# Patient Record
Sex: FEMALE | Race: ASIAN | NOT HISPANIC OR LATINO | Employment: OTHER | URBAN - METROPOLITAN AREA
[De-identification: names, ages, dates, MRNs, and addresses within clinical notes are randomized per-mention and may not be internally consistent; named-entity substitution may affect disease eponyms.]

---

## 2022-07-14 ENCOUNTER — HOSPITAL ENCOUNTER (INPATIENT)
Facility: HOSPITAL | Age: 72
LOS: 1 days | Discharge: LEFT BEFORE DISCHARGE | DRG: 812 | End: 2022-07-15
Attending: FAMILY MEDICINE | Admitting: STUDENT IN AN ORGANIZED HEALTH CARE EDUCATION/TRAINING PROGRAM
Payer: COMMERCIAL

## 2022-07-14 DIAGNOSIS — I48.91 ATRIAL FIBRILLATION WITH RVR (H): ICD-10-CM

## 2022-07-14 DIAGNOSIS — D50.9 IRON DEFICIENCY ANEMIA, UNSPECIFIED IRON DEFICIENCY ANEMIA TYPE: ICD-10-CM

## 2022-07-14 LAB
ABO/RH(D): NORMAL
ABO/RH(D): NORMAL
ALBUMIN SERPL-MCNC: 3.3 G/DL (ref 3.5–5)
ALP SERPL-CCNC: 111 U/L (ref 45–120)
ALT SERPL W P-5'-P-CCNC: 23 U/L (ref 0–45)
ANION GAP SERPL CALCULATED.3IONS-SCNC: 7 MMOL/L (ref 5–18)
ANTIBODY SCREEN: NEGATIVE
AST SERPL W P-5'-P-CCNC: 33 U/L (ref 0–40)
BASOPHILS # BLD AUTO: 0 10E3/UL (ref 0–0.2)
BASOPHILS NFR BLD AUTO: 0 %
BILIRUB DIRECT SERPL-MCNC: 0.2 MG/DL
BILIRUB SERPL-MCNC: 0.7 MG/DL (ref 0–1)
BLD PROD TYP BPU: NORMAL
BLD PROD TYP BPU: NORMAL
BLOOD COMPONENT TYPE: NORMAL
BLOOD COMPONENT TYPE: NORMAL
BUN SERPL-MCNC: 7 MG/DL (ref 8–28)
CALCIUM SERPL-MCNC: 9.9 MG/DL (ref 8.5–10.5)
CHLORIDE BLD-SCNC: 106 MMOL/L (ref 98–107)
CO2 SERPL-SCNC: 23 MMOL/L (ref 22–31)
CODING SYSTEM: NORMAL
CODING SYSTEM: NORMAL
CREAT SERPL-MCNC: 0.77 MG/DL (ref 0.6–1.1)
CROSSMATCH: NORMAL
CROSSMATCH: NORMAL
EOSINOPHIL # BLD AUTO: 0.1 10E3/UL (ref 0–0.7)
EOSINOPHIL NFR BLD AUTO: 1 %
ERYTHROCYTE [DISTWIDTH] IN BLOOD BY AUTOMATED COUNT: 22.5 % (ref 10–15)
GFR SERPL CREATININE-BSD FRML MDRD: 81 ML/MIN/1.73M2
GLUCOSE BLD-MCNC: 145 MG/DL (ref 70–125)
HCT VFR BLD AUTO: 19.8 % (ref 35–47)
HGB BLD-MCNC: 4.9 G/DL (ref 11.7–15.7)
HOLD SPECIMEN: NORMAL
IMM GRANULOCYTES # BLD: 0.1 10E3/UL
IMM GRANULOCYTES NFR BLD: 1 %
ISSUE DATE AND TIME: NORMAL
ISSUE DATE AND TIME: NORMAL
LYMPHOCYTES # BLD AUTO: 1.5 10E3/UL (ref 0.8–5.3)
LYMPHOCYTES NFR BLD AUTO: 27 %
MCH RBC QN AUTO: 15.8 PG (ref 26.5–33)
MCHC RBC AUTO-ENTMCNC: 24.7 G/DL (ref 31.5–36.5)
MCV RBC AUTO: 64 FL (ref 78–100)
MONOCYTES # BLD AUTO: 0.5 10E3/UL (ref 0–1.3)
MONOCYTES NFR BLD AUTO: 9 %
NEUTROPHILS # BLD AUTO: 3.5 10E3/UL (ref 1.6–8.3)
NEUTROPHILS NFR BLD AUTO: 62 %
NRBC # BLD AUTO: 0.1 10E3/UL
NRBC BLD AUTO-RTO: 2 /100
PLATELET # BLD AUTO: 158 10E3/UL (ref 150–450)
POTASSIUM BLD-SCNC: 3.6 MMOL/L (ref 3.5–5)
PROT SERPL-MCNC: 7.2 G/DL (ref 6–8)
RBC # BLD AUTO: 3.11 10E6/UL (ref 3.8–5.2)
SODIUM SERPL-SCNC: 136 MMOL/L (ref 136–145)
SPECIMEN EXPIRATION DATE: NORMAL
SPECIMEN EXPIRATION DATE: NORMAL
UNIT ABO/RH: NORMAL
UNIT ABO/RH: NORMAL
UNIT NUMBER: NORMAL
UNIT NUMBER: NORMAL
UNIT STATUS: NORMAL
UNIT STATUS: NORMAL
UNIT TYPE ISBT: 7300
UNIT TYPE ISBT: 7300
WBC # BLD AUTO: 5.7 10E3/UL (ref 4–11)

## 2022-07-14 PROCEDURE — 80053 COMPREHEN METABOLIC PANEL: CPT | Performed by: FAMILY MEDICINE

## 2022-07-14 PROCEDURE — 36415 COLL VENOUS BLD VENIPUNCTURE: CPT | Performed by: STUDENT IN AN ORGANIZED HEALTH CARE EDUCATION/TRAINING PROGRAM

## 2022-07-14 PROCEDURE — 82728 ASSAY OF FERRITIN: CPT | Performed by: STUDENT IN AN ORGANIZED HEALTH CARE EDUCATION/TRAINING PROGRAM

## 2022-07-14 PROCEDURE — 96374 THER/PROPH/DIAG INJ IV PUSH: CPT

## 2022-07-14 PROCEDURE — 84443 ASSAY THYROID STIM HORMONE: CPT | Performed by: STUDENT IN AN ORGANIZED HEALTH CARE EDUCATION/TRAINING PROGRAM

## 2022-07-14 PROCEDURE — 250N000011 HC RX IP 250 OP 636: Performed by: FAMILY MEDICINE

## 2022-07-14 PROCEDURE — 36430 TRANSFUSION BLD/BLD COMPNT: CPT

## 2022-07-14 PROCEDURE — 93005 ELECTROCARDIOGRAM TRACING: CPT | Performed by: FAMILY MEDICINE

## 2022-07-14 PROCEDURE — 258N000003 HC RX IP 258 OP 636: Performed by: FAMILY MEDICINE

## 2022-07-14 PROCEDURE — 84466 ASSAY OF TRANSFERRIN: CPT | Performed by: FAMILY MEDICINE

## 2022-07-14 PROCEDURE — 86901 BLOOD TYPING SEROLOGIC RH(D): CPT | Performed by: FAMILY MEDICINE

## 2022-07-14 PROCEDURE — 83550 IRON BINDING TEST: CPT | Performed by: FAMILY MEDICINE

## 2022-07-14 PROCEDURE — 87635 SARS-COV-2 COVID-19 AMP PRB: CPT | Performed by: FAMILY MEDICINE

## 2022-07-14 PROCEDURE — 99285 EMERGENCY DEPT VISIT HI MDM: CPT | Mod: 25

## 2022-07-14 PROCEDURE — P9016 RBC LEUKOCYTES REDUCED: HCPCS | Performed by: FAMILY MEDICINE

## 2022-07-14 PROCEDURE — C9803 HOPD COVID-19 SPEC COLLECT: HCPCS

## 2022-07-14 PROCEDURE — 82607 VITAMIN B-12: CPT | Performed by: STUDENT IN AN ORGANIZED HEALTH CARE EDUCATION/TRAINING PROGRAM

## 2022-07-14 PROCEDURE — C9113 INJ PANTOPRAZOLE SODIUM, VIA: HCPCS | Performed by: FAMILY MEDICINE

## 2022-07-14 PROCEDURE — 85048 AUTOMATED LEUKOCYTE COUNT: CPT | Performed by: FAMILY MEDICINE

## 2022-07-14 PROCEDURE — 86923 COMPATIBILITY TEST ELECTRIC: CPT | Performed by: FAMILY MEDICINE

## 2022-07-14 PROCEDURE — 82248 BILIRUBIN DIRECT: CPT | Performed by: STUDENT IN AN ORGANIZED HEALTH CARE EDUCATION/TRAINING PROGRAM

## 2022-07-14 PROCEDURE — 36415 COLL VENOUS BLD VENIPUNCTURE: CPT | Performed by: FAMILY MEDICINE

## 2022-07-14 PROCEDURE — 85048 AUTOMATED LEUKOCYTE COUNT: CPT | Performed by: STUDENT IN AN ORGANIZED HEALTH CARE EDUCATION/TRAINING PROGRAM

## 2022-07-14 RX ORDER — ACETAMINOPHEN 325 MG/1
650 TABLET ORAL EVERY 6 HOURS PRN
Status: DISCONTINUED | OUTPATIENT
Start: 2022-07-14 | End: 2022-07-15 | Stop reason: HOSPADM

## 2022-07-14 RX ORDER — ACETAMINOPHEN 650 MG/1
650 SUPPOSITORY RECTAL EVERY 6 HOURS PRN
Status: DISCONTINUED | OUTPATIENT
Start: 2022-07-14 | End: 2022-07-15 | Stop reason: HOSPADM

## 2022-07-14 RX ORDER — ONDANSETRON 4 MG/1
4 TABLET, ORALLY DISINTEGRATING ORAL EVERY 6 HOURS PRN
Status: DISCONTINUED | OUTPATIENT
Start: 2022-07-14 | End: 2022-07-15 | Stop reason: HOSPADM

## 2022-07-14 RX ORDER — ACETAMINOPHEN 325 MG/1
325-650 TABLET ORAL EVERY 6 HOURS PRN
COMMUNITY

## 2022-07-14 RX ORDER — HYDROXYZINE HYDROCHLORIDE 10 MG/1
10 TABLET, FILM COATED ORAL 3 TIMES DAILY PRN
Status: DISCONTINUED | OUTPATIENT
Start: 2022-07-14 | End: 2022-07-15 | Stop reason: HOSPADM

## 2022-07-14 RX ORDER — ONDANSETRON 2 MG/ML
4 INJECTION INTRAMUSCULAR; INTRAVENOUS EVERY 6 HOURS PRN
Status: DISCONTINUED | OUTPATIENT
Start: 2022-07-14 | End: 2022-07-15 | Stop reason: HOSPADM

## 2022-07-14 RX ORDER — IBUPROFEN 200 MG
400 TABLET ORAL EVERY 4 HOURS PRN
COMMUNITY
End: 2022-07-15

## 2022-07-14 RX ADMIN — SODIUM CHLORIDE 500 ML: 9 INJECTION, SOLUTION INTRAVENOUS at 22:39

## 2022-07-14 RX ADMIN — PANTOPRAZOLE SODIUM 40 MG: 40 INJECTION, POWDER, FOR SOLUTION INTRAVENOUS at 22:37

## 2022-07-14 ASSESSMENT — ENCOUNTER SYMPTOMS
WEAKNESS: 1
DIZZINESS: 1
COUGH: 1
DIARRHEA: 0
BLOOD IN STOOL: 0
FATIGUE: 1
NAUSEA: 0
VOMITING: 0
SHORTNESS OF BREATH: 0

## 2022-07-15 ENCOUNTER — APPOINTMENT (OUTPATIENT)
Dept: CARDIOLOGY | Facility: HOSPITAL | Age: 72
DRG: 812 | End: 2022-07-15
Attending: STUDENT IN AN ORGANIZED HEALTH CARE EDUCATION/TRAINING PROGRAM
Payer: COMMERCIAL

## 2022-07-15 ENCOUNTER — APPOINTMENT (OUTPATIENT)
Dept: CT IMAGING | Facility: HOSPITAL | Age: 72
DRG: 812 | End: 2022-07-15
Attending: STUDENT IN AN ORGANIZED HEALTH CARE EDUCATION/TRAINING PROGRAM
Payer: COMMERCIAL

## 2022-07-15 VITALS
TEMPERATURE: 97.8 F | DIASTOLIC BLOOD PRESSURE: 111 MMHG | SYSTOLIC BLOOD PRESSURE: 147 MMHG | HEART RATE: 100 BPM | HEIGHT: 60 IN | RESPIRATION RATE: 25 BRPM | WEIGHT: 120 LBS | BODY MASS INDEX: 23.56 KG/M2 | OXYGEN SATURATION: 90 %

## 2022-07-15 PROBLEM — I48.91 ATRIAL FIBRILLATION WITH RVR (H): Status: ACTIVE | Noted: 2022-07-15

## 2022-07-15 LAB
ATRIAL RATE - MUSE: 125 BPM
BASOPHILS # BLD AUTO: 0 10E3/UL (ref 0–0.2)
BASOPHILS NFR BLD AUTO: 0 %
DIASTOLIC BLOOD PRESSURE - MUSE: NORMAL MMHG
EOSINOPHIL # BLD AUTO: 0.1 10E3/UL (ref 0–0.7)
EOSINOPHIL NFR BLD AUTO: 1 %
ERYTHROCYTE [DISTWIDTH] IN BLOOD BY AUTOMATED COUNT: 26.4 % (ref 10–15)
FERRITIN SERPL-MCNC: 3 NG/ML (ref 11–328)
FOLATE SERPL-MCNC: 11.3 NG/ML (ref 4.6–34.8)
HCT VFR BLD AUTO: 30.5 % (ref 35–47)
HGB BLD-MCNC: 8.5 G/DL (ref 11.7–15.7)
HGB BLD-MCNC: 8.9 G/DL (ref 11.7–15.7)
IMM GRANULOCYTES # BLD: 0.1 10E3/UL
IMM GRANULOCYTES NFR BLD: 1 %
INTERPRETATION ECG - MUSE: NORMAL
IRON BINDING CAPACITY (ROCHE): 439 UG/DL (ref 240–430)
IRON SATN MFR SERPL: 3 % (ref 15–46)
IRON SERPL-MCNC: 13 UG/DL (ref 37–145)
LYMPHOCYTES # BLD AUTO: 1.5 10E3/UL (ref 0.8–5.3)
LYMPHOCYTES NFR BLD AUTO: 23 %
MCH RBC QN AUTO: 20 PG (ref 26.5–33)
MCHC RBC AUTO-ENTMCNC: 27.9 G/DL (ref 31.5–36.5)
MCV RBC AUTO: 72 FL (ref 78–100)
MONOCYTES # BLD AUTO: 0.4 10E3/UL (ref 0–1.3)
MONOCYTES NFR BLD AUTO: 6 %
NEUTROPHILS # BLD AUTO: 4.6 10E3/UL (ref 1.6–8.3)
NEUTROPHILS NFR BLD AUTO: 69 %
NRBC # BLD AUTO: 0.1 10E3/UL
NRBC BLD AUTO-RTO: 1 /100
P AXIS - MUSE: NORMAL DEGREES
PLATELET # BLD AUTO: 180 10E3/UL (ref 150–450)
PR INTERVAL - MUSE: NORMAL MS
QRS DURATION - MUSE: 90 MS
QT - MUSE: 312 MS
QTC - MUSE: 459 MS
R AXIS - MUSE: 25 DEGREES
RBC # BLD AUTO: 4.26 10E6/UL (ref 3.8–5.2)
SARS-COV-2 RNA RESP QL NAA+PROBE: NEGATIVE
SYSTOLIC BLOOD PRESSURE - MUSE: NORMAL MMHG
T AXIS - MUSE: -33 DEGREES
TRANSFERRIN SERPL-MCNC: 364 MG/DL (ref 200–360)
TSH SERPL DL<=0.005 MIU/L-ACNC: 0.97 UIU/ML (ref 0.3–5)
VENTRICULAR RATE- MUSE: 130 BPM
VIT B12 SERPL-MCNC: 277 PG/ML (ref 232–1245)
WBC # BLD AUTO: 6.6 10E3/UL (ref 4–11)

## 2022-07-15 PROCEDURE — P9016 RBC LEUKOCYTES REDUCED: HCPCS | Performed by: FAMILY MEDICINE

## 2022-07-15 PROCEDURE — 255N000002 HC RX 255 OP 636: Performed by: STUDENT IN AN ORGANIZED HEALTH CARE EDUCATION/TRAINING PROGRAM

## 2022-07-15 PROCEDURE — 36430 TRANSFUSION BLD/BLD COMPNT: CPT

## 2022-07-15 PROCEDURE — 93306 TTE W/DOPPLER COMPLETE: CPT | Mod: 26 | Performed by: INTERNAL MEDICINE

## 2022-07-15 PROCEDURE — 250N000013 HC RX MED GY IP 250 OP 250 PS 637: Performed by: INTERNAL MEDICINE

## 2022-07-15 PROCEDURE — 85004 AUTOMATED DIFF WBC COUNT: CPT | Performed by: STUDENT IN AN ORGANIZED HEALTH CARE EDUCATION/TRAINING PROGRAM

## 2022-07-15 PROCEDURE — 120N000001 HC R&B MED SURG/OB

## 2022-07-15 PROCEDURE — 82746 ASSAY OF FOLIC ACID SERUM: CPT | Performed by: STUDENT IN AN ORGANIZED HEALTH CARE EDUCATION/TRAINING PROGRAM

## 2022-07-15 PROCEDURE — 74177 CT ABD & PELVIS W/CONTRAST: CPT

## 2022-07-15 PROCEDURE — 250N000011 HC RX IP 250 OP 636: Performed by: STUDENT IN AN ORGANIZED HEALTH CARE EDUCATION/TRAINING PROGRAM

## 2022-07-15 PROCEDURE — 258N000003 HC RX IP 258 OP 636: Performed by: STUDENT IN AN ORGANIZED HEALTH CARE EDUCATION/TRAINING PROGRAM

## 2022-07-15 PROCEDURE — G0378 HOSPITAL OBSERVATION PER HR: HCPCS

## 2022-07-15 PROCEDURE — 36415 COLL VENOUS BLD VENIPUNCTURE: CPT | Performed by: STUDENT IN AN ORGANIZED HEALTH CARE EDUCATION/TRAINING PROGRAM

## 2022-07-15 PROCEDURE — 85018 HEMOGLOBIN: CPT | Performed by: INTERNAL MEDICINE

## 2022-07-15 PROCEDURE — 99222 1ST HOSP IP/OBS MODERATE 55: CPT | Performed by: INTERNAL MEDICINE

## 2022-07-15 PROCEDURE — 250N000013 HC RX MED GY IP 250 OP 250 PS 637: Performed by: STUDENT IN AN ORGANIZED HEALTH CARE EDUCATION/TRAINING PROGRAM

## 2022-07-15 PROCEDURE — 36415 COLL VENOUS BLD VENIPUNCTURE: CPT | Performed by: INTERNAL MEDICINE

## 2022-07-15 RX ORDER — IOPAMIDOL 755 MG/ML
75 INJECTION, SOLUTION INTRAVASCULAR ONCE
Status: COMPLETED | OUTPATIENT
Start: 2022-07-15 | End: 2022-07-15

## 2022-07-15 RX ORDER — SODIUM CHLORIDE 9 MG/ML
INJECTION, SOLUTION INTRAVENOUS CONTINUOUS
Status: DISCONTINUED | OUTPATIENT
Start: 2022-07-15 | End: 2022-07-15 | Stop reason: HOSPADM

## 2022-07-15 RX ORDER — METOPROLOL TARTRATE 25 MG/1
25 TABLET, FILM COATED ORAL 2 TIMES DAILY
Qty: 14 TABLET | Refills: 1 | Status: SHIPPED | OUTPATIENT
Start: 2022-07-15 | End: 2022-07-22

## 2022-07-15 RX ORDER — METOPROLOL TARTRATE 25 MG/1
25 TABLET, FILM COATED ORAL 2 TIMES DAILY
Status: DISCONTINUED | OUTPATIENT
Start: 2022-07-15 | End: 2022-07-15

## 2022-07-15 RX ORDER — FERROUS SULFATE 325(65) MG
325 TABLET ORAL
Qty: 30 TABLET | Refills: 1 | Status: SHIPPED | OUTPATIENT
Start: 2022-07-15

## 2022-07-15 RX ORDER — METOPROLOL TARTRATE 25 MG/1
25 TABLET, FILM COATED ORAL 4 TIMES DAILY
Status: DISCONTINUED | OUTPATIENT
Start: 2022-07-15 | End: 2022-07-15 | Stop reason: HOSPADM

## 2022-07-15 RX ADMIN — METOPROLOL TARTRATE 12.5 MG: 25 TABLET, FILM COATED ORAL at 05:29

## 2022-07-15 RX ADMIN — METOPROLOL TARTRATE 25 MG: 25 TABLET, FILM COATED ORAL at 12:16

## 2022-07-15 RX ADMIN — METOPROLOL TARTRATE 12.5 MG: 25 TABLET, FILM COATED ORAL at 10:20

## 2022-07-15 RX ADMIN — SODIUM CHLORIDE: 9 INJECTION, SOLUTION INTRAVENOUS at 03:57

## 2022-07-15 RX ADMIN — PERFLUTREN 3 ML: 6.52 INJECTION, SUSPENSION INTRAVENOUS at 11:15

## 2022-07-15 RX ADMIN — IOPAMIDOL 75 ML: 755 INJECTION, SOLUTION INTRAVENOUS at 00:50

## 2022-07-15 ASSESSMENT — ACTIVITIES OF DAILY LIVING (ADL)
DEPENDENT_IADLS:: CLEANING;LAUNDRY;COOKING;SHOPPING;MEAL PREPARATION;MEDICATION MANAGEMENT;TRANSPORTATION;MONEY MANAGEMENT
ADLS_ACUITY_SCORE: 35
ADLS_ACUITY_SCORE: 35

## 2022-07-15 NOTE — H&P
[facility]    History and Physical - Hospitalist Service       Date of Admission:  7/14/2022    Assessment & Plan      Alistair Cuevas is a 72 year old female admitted on 7/14/2022.    72-year-old female past medical history of iron vitamin B12 and iron deficiency anemia, hypertension, dementia, hyperparathyroidism and osteoporosis who transferred to [facility] from urgent care for severe anemia    Microcytic anemia  -Previously known iron deficiency and vitamin B-12 deficiency anemia, hemoglobin 4.9, previous baseline  11.5 in 2017.  Denies any overt bleed or melena.  Iron work-up sent prior to transfusion,  transfuse to keep hemoglobin>7.  Follow serial hemoglobin, get CT abdomen/pelvis, GI consulted    A. fib, new onset  -Rates 110s, chest x-ray at outside facility with cardiomegaly.  Keep on telemetry, check TSH, echo in the a.m, consider low-dose metoprolol if still tachycardic after transfusion,         History of hypertension  -Blood pressure on softer side, not on antihypertensives     History of hyperparathyroidism  -Serum calcium stable, check PTH & vitamin D levels    Addendum 05:21  CT abdomen unremarkable but showing cardiomegaly and small pericardial effusion, patient continues to be tachycardic after 2 units of blood.  Will start on metoprolol 12.5 twice daily, cardiology to see this a.m.         Diet: NPO for Medical/Clinical Reasons Except for: Ice Chips  DVT Prophylaxis: Pneumatic Compression Devices  Nieves Catheter: Not present  Central Lines: None  Cardiac Monitoring: ACTIVE order. Indication: Tachyarrhythmias, acute (48 hours)  Code Status: Full Code    Clinically Significant Risk Factors Present on Admission     Disposition Plan   The patient's care was discussed with the Patient and Patient's Family.    Howard Quiros MD  Hospitalist Service  [facility]  Securely message with the Vocera Web Console (learn more here)  Text page via Contrib  Paging/Directory         ______________________________________________________________________    Chief Complaint   Generalized weakness, cough    History is obtained from the patient, her son by the bedside helping with interpretation.  Declined professional language line.    History of Present Illness   Alistair Cuevas is a 72-year-old female past medical history of iron vitamin B12 and iron deficiency anemia, hypertension, dementia, hyperparathyroidism and osteoporosis who transferred to [facility] from urgent care for severe anemia.  She was having dry cough for about 2 weeks which is has since subsided but the last few days feeling generally weak with dyspnea on exertion.  She has known iron deficiency anemia since about 5 years ago but has not been on any iron supplements, and never had colonoscopy or endoscopy so far.  Denies any overt bleed or melena, no fever, no chills.      Review of Systems    The 10 point Review of Systems is negative other than noted in the HPI or here.     Past Medical History    I have reviewed this patient's medical history and updated it with pertinent information if needed.   History reviewed. No pertinent past medical history.    Past Surgical History   I have reviewed this patient's surgical history and updated it with pertinent information if needed.  History reviewed. No pertinent surgical history.    Social History   I have reviewed this patient's social history and updated it with pertinent information if needed.       Family History   Reviewed, noncontributory    Prior to Admission Medications   Prior to Admission Medications   Prescriptions Last Dose Informant Patient Reported? Taking?   acetaminophen (TYLENOL) 325 MG tablet Past Month at Unknown time  Yes Yes   Sig: Take 325-650 mg by mouth every 6 hours as needed for mild pain   ibuprofen (ADVIL/MOTRIN) 200 MG tablet Past Month at Unknown time  Yes Yes   Sig: Take 400 mg by mouth every 4 hours as needed for mild pain       Facility-Administered Medications: None     Allergies   No Known Allergies    Physical Exam   Vital Signs: Temp: 97.8  F (36.6  C) Temp src: Oral BP: 121/58 Pulse: 107   Resp: 20 SpO2: 100 % O2 Device: None (Room air)    Weight: 120 lbs 0 oz    General Appearance: Comfortably lying in bed,  Eyes: Pale conjunctiva  HEENT: PERRLA  Respiratory: Bilateral good air entry  Cardiovascular: S1-S2, tachycardic, irregular rate  GI: Soft abdomen, no palpable mass  Genitourinary: No SP tenderness  Skin: No rash  Musculoskeletal: No edema  Neurologic: AOx3      Data   Data reviewed today: I reviewed all medications, new labs and imaging results over the last 24 hours. I personally reviewed             Recent Labs   Lab 07/14/22  2055   WBC 5.7   HGB 4.9*   MCV 64*         POTASSIUM 3.6   CHLORIDE 106   CO2 23   BUN 7*   CR 0.77   ANIONGAP 7   SUDEEP 9.9   *     No results found for this or any previous visit (from the past 24 hour(s)).

## 2022-07-15 NOTE — ED NOTES
The patient through her family says she wants to go home. Does not want to stay in the ED. Advised the provider.

## 2022-07-15 NOTE — DISCHARGE SUMMARY
Alistair Cuevas is a 72 year old female admitted on 7/14/2022.     72-year-old female past medical history of iron vitamin B12 and iron deficiency anemia, hypertension, dementia, hyperparathyroidism and osteoporosis who transferred to [facility] from urgent care for severe anemia     Microcytic anemia  -Previously known iron deficiency and vitamin B-12 deficiency anemia, hemoglobin 4.9, previous baseline  11.5 in 2017.  Denies any overt bleed or melena.  Iron work-up sent prior to transfusion,  transfuse to keep hemoglobin>7.  Follow serial hemoglobin, get CT abdomen/pelvis, GI consulted  -hgb up to 8.5 after transfusion  -GI: pt refused EGD/colonoscopy  -patient insisted on going home AMA; I spent >45 minutes to try to convince her to stay since her hr s still not in good control and workup for anemia is not done (with the help from the son Ray; pt is ok to use family member for communication instead of interpretter. Her language is Kareni, not familia, unable to get service). Patient understood the risks of leaving AMA (include but not limited to bleeding, tachycardia, chf or death). She still decided to go home AMA. AMA form signed with the help from the daughter.   -metoprolol and Iron supplement sent to pharmacy     A. fib, new onset  -Rates 110s, chest x-ray at outside facility with cardiomegaly.  Keep on telemetry, check TSH,   -echo : left atrial enlargement  - metoprolol if still tachycardic after transfusion- on metoprolol 25 mg po    -appreciate card consultation.   -no anticoagulation for now due to anemia  -recommended to follow up with pcp/card as outpatient        History of hypertension  -Blood pressure on softer side, not on antihypertensives   -metoprolol     History of hyperparathyroidism  -Serum calcium stable, check PTH & vitamin D levels     CT abdomen unremarkable but showing cardiomegaly and small pericardial effusion, patient continues to be tachycardic after 2 units of blood.  Will start on  metoprolol 12.5 twice daily, cardiology to see this a.m.

## 2022-07-15 NOTE — ED NOTES
Blood running. Pt resting calmly. She denies sob. Denies itching. Skin is pale, warm, and dry. Lungs clear and equal bilaterally.

## 2022-07-15 NOTE — CONSULTS
"GI CONSULT NOTE      Name: Alistair Cuevas  Medical Record #: [id number]  YOB: 1950  Date of Admission: 7/14/2022  Date/Time: 7/15/2022/10:21 AM     CHIEF COMPLAINT: Generalized weakness and dyspnea on exertion    HISTORY OF PRESENT ILLNESS: We were asked to see Alistair Cuevas by Dr Quiros for evaluation of anemia.   Alistair Cuevas is a 72 year old year old female with history of reported iron and B12 deficiency, hypertension, memory loss, hyperparathyroidism, and osteoporosis who initially presented to an urgent care for further evaluation of weakness.  She was found to have severe anemia with hemoglobin of 4.9 (hgb 11.7 2017) and therefore sent to [facility] for further evaluation.  The patient was found to be in atrial fibrillation with rapid ventricular response. Evaluation included a CT abdomen and pelvis with contrast revealed cardiomegaly, duodenal diverticulum, and small pericardial effusion.  Further evaluation by cardiology is underway. The patient received 2 units of packed red blood cells with appropriate response--hemoglobin up to 8.5 on repeat. Folate, iron studies and vitamin B12 are pending.    The patient reports having a normal bowel pattern with typically passing 1 stool per day.  She denies noting any black or bloody stools.  She denies having any nausea or vomiting.  She does describe having a \"burning in her chest\" intermittently but this is not related to oral intake.  He notes more chest burning when she feels exhausted.  Her body weight is stable.  [facility] for evaluation of iron deficiency anemia which was with a poor prep and therefore 1 year follow-up recommended but never pursued.  No prior upper endoscopy.  She does not take NSAIDs.  She denies using alcohol and tobacco.    Reviewed potential next step to include upper endoscopy and colonoscopy (Huma ruff present).  The patient is not not interested in proceeding with " any procedures at this time.  I provided the option of outpatient endoscopic evaluation but she declines.        REVIEW OF SYSTEMS (ROS): Complete review of systems negative other than listed in HPI.    PAST MEDICAL HISTORY:  History reviewed. No pertinent past medical history.     FAMILY HISTORY:  No family history on file.    SOCIAL HISTORY:  Social History     Socioeconomic History     Marital status: Single     Spouse name: Not on file     Number of children: Not on file     Years of education: Not on file     Highest education level: Not on file   Occupational History     Not on file   Tobacco Use     Smoking status: Not on file     Smokeless tobacco: Not on file   Substance and Sexual Activity     Alcohol use: Not on file     Drug use: Not on file     Sexual activity: Not on file   Other Topics Concern     Not on file   Social History Narrative     Not on file     Social Determinants of Health     Financial Resource Strain: Not on file   Food Insecurity: Not on file   Transportation Needs: Not on file   Physical Activity: Not on file   Stress: Not on file   Social Connections: Not on file   Intimate Partner Violence: Not on file   Housing Stability: Not on file       MEDICATIONS PRIOR TO ADMISSION: (Not in a hospital admission)         ALLERGIES: Patient has no known allergies.    PHYSICAL EXAM:    /70   Pulse (!) 121   Temp 97.8  F (36.6  C) (Oral)   Resp 22   Ht 1.524 m (5')   Wt 54.4 kg (120 lb)   SpO2 99%   BMI 23.44 kg/m      GENERAL: Cooperative, responds appropriately via language line .  NECK: Supple without adenopathy  EYES: No scleral icterus  LUNGS: Clear to auscultation bilaterally  HEART: S1S2, tachy  ABDOMEN: Non-distended. Positive bowel sounds. Soft, non-tender, no guarding/rebound/mass, no obvious organomegaly  MUSKULOSKELETAL:  Warm and well perfused, moves all extremities well  SKIN: No jaundice  PSYCHIATRIC: flat affect    LAB DATA:  CMP Results:   Recent Labs   Lab  Test 07/14/22 2055      POTASSIUM 3.6   CHLORIDE 106   CO2 23   ANIONGAP 7   *   BUN 7*   CR 0.77   BILITOTAL 0.7   ALKPHOS 111   ALT 23   AST 33      CBC  Recent Labs   Lab 07/15/22  0559 07/14/22 2055   WBC 6.6 5.7   RBC 4.26 3.11*   HGB 8.5* 4.9*   HCT 30.5* 19.8*   MCV 72* 64*   MCH 20.0* 15.8*   MCHC 27.9* 24.7*   RDW 26.4* 22.5*    158     INRNo lab results found in last 7 days.   No results found for: LIPASE    IMAGING:  EXAM: CT ABDOMEN PELVIS W CONTRAST  LOCATION: [facility]  DATE/TIME: 7/15/2022 12:46 AM     INDICATION: Severe iron deficiency anemia  COMPARISON: None.  TECHNIQUE: CT scan of the abdomen and pelvis was performed following injection of IV contrast. Multiplanar reformats were obtained. Dose reduction techniques were used.  CONTRAST: isovue 370 75ml     FINDINGS:   LOWER CHEST: Heart is enlarged. There is a small pericardial effusion.      HEPATOBILIARY: Normal contour with no significant mass. No bile duct dilatation. Calcified gallstones.     PANCREAS: No significant mass, duct dilatation, or inflammatory change.     SPLEEN: Normal size.     ADRENAL GLANDS: No significant nodules.     KIDNEYS/BLADDER: No significant mass, stone, or hydronephrosis.     BOWEL: No obstruction or inflammatory change. A duodenal diverticulum is noted, arising from the distal aspect of the second portion of the duodenum. The appendix is unremarkable.     LYMPH NODES: No lymphadenopathy.     VASCULATURE: No abdominal aortic aneurysm.     PELVIC ORGANS: No pelvic masses.     MUSCULOSKELETAL: Unremarkable.                                                                      IMPRESSION:   1.  Cardiomegaly with a small pericardial effusion  2.  Normal appendix    ASSESSMENT:  Microcytic anemia, no overt signs of gi bleeding-- This is a 73 yo female with reported history of iron and B12 deficiency, hypertension, memory loss, hyperparathyroidism, and osteoporosis who  presented with weakness and was found to have severe anemia (hgb 4.9) and atrial fibrillation with rapid ventricular response. No reports of overt gi bleeding, but intermittent bleeding from esophagits/PUD/duodenitis, AVMs, or even malignancy are possible contributing factors. The patient received  2 units RBCs with appropriate response-- hgb up to 8.5. Given severity of anemia, upper endoscopy and colonoscopy is recommended but the patient declines. Agree with checking folate, vitamin B12, and iron studies.     PLAN:  EGD and colonoscopy has been recommended, but the patient is not willing to undergo procedures. I encouraged the patient and her daughter to further discuss with her primary provider. If the patient changes her mind, EGD and colonoscopy can be arranged.   Continue with supportive cares-- transfuse RBCs as needed. Agree with checking iron studies, vitamin B12, and folate.   [facility] will sign off. Call with questions.     Total time spent on this encounter= 50 minutes  Will discuss with Dr. Maciel who will also visit with the patient.                                                Yodit Bal PA-C  Thank you for the opportunity to participate in the care of this patient.   Please feel free to call me with any questions or concerns.  Phone number [phone].

## 2022-07-15 NOTE — CONSULTS
Care Management Initial Consult    General Information  Assessment completed with: Patient, Children, Maw, Ray  Type of CM/SW Visit: Initial Assessment    Primary Care Provider verified and updated as needed: No   Readmission within the last 30 days:        Reason for Consult: discharge planning  Advance Care Planning: Advance Care Planning Reviewed: no concerns identified          Communication Assessment  Patient's communication style: spoken language (non-English)             Cognitive  Cognitive/Neuro/Behavioral: WDL                      Living Environment:   People in home: alone     Current living Arrangements: apartment      Able to return to prior arrangements: yes       Family/Social Support:  Care provided by: self, other (see comments) (PCA)  Provides care for:    Marital Status: Single  Children, PCA          Description of Support System: Involved, Supportive    Support Assessment: Adequate social supports, Adequate family and caregiver support    Current Resources:   Patient receiving home care services: No     Community Resources: PCA, DME  Equipment currently used at home: cane, straight, walker, rolling, grab bar, tub/shower, grab bar, toilet  Supplies currently used at home: Incontinence Supplies    Employment/Financial:  Employment Status:          Financial Concerns: No concerns identified   Referral to Financial Worker: No       Lifestyle & Psychosocial Needs:  Social Determinants of Health     Tobacco Use: Not on file   Alcohol Use: Not on file   Financial Resource Strain: Not on file   Food Insecurity: Not on file   Transportation Needs: Not on file   Physical Activity: Not on file   Stress: Not on file   Social Connections: Not on file   Intimate Partner Violence: Not on file   Depression: Not on file   Housing Stability: Not on file       Functional Status:  Prior to admission patient needed assistance:   Dependent ADLs:: Bathing, Dressing, Transfers, Toileting  Dependent IADLs:: Cleaning,  Laundry, Cooking, Shopping, Meal Preparation, Medication Management, Transportation, Money Management  Assesssment of Functional Status: At functional baseline    Mental Health Status:  Mental Health Status: Current Concern       Chemical Dependency Status:  Chemical Dependency Status: No Current Concerns             Values/Beliefs:  Spiritual, Cultural Beliefs, Alevism Practices, Values that affect care: no               Additional Information:  FORD assessed, spoke with pt's son, Ray over the phone who assisted with interpretation. Pt resides in a senior living apartment alone, she is dependent with ADLs and IADLs and receives PCA hours to assist with this. Ray has some concerns about the pt being alone at home when the PCA is not present and states that her children check in regularly. He states that the pt is hesitant to allow outside providers in her home, and so it is difficult to have any additional supports see her. Discharge goal to return home with existing services. Family transport.     Kami Ignacio LGSW

## 2022-07-15 NOTE — ED NOTES
She is doing well at this time. She denies chest pain or SOB or any pain at this time. She wants to go home. She is feeling better. Her daughter is in the room helping with translation.

## 2022-07-15 NOTE — ED NOTES
[facility] ED Handoff Report    ED Chief Complaint: weakness    ED Diagnosis:  (D50.9) Iron deficiency anemia, unspecified iron deficiency anemia type  Comment:   Plan:     (I48.91) Atrial fibrillation with RVR (H)  Comment:   Plan:        PMH:  History reviewed. No pertinent past medical history.     Code Status:  Full Code     Falls Risk: No Band: Not applicable    Current Living Situation/Residence: lives with their son or daughter     Elimination Status: Continent: Yes     Activity Level: walks with a cane    Patients Preferred Language:  Other: familia     Needed: Yes    Vital Signs:  /59   Pulse 110   Temp 97.5  F (36.4  C) (Oral)   Resp 16   Ht 1.524 m (5')   Wt 54.4 kg (120 lb)   SpO2 98%   BMI 23.44 kg/m       Cardiac Rhythm: afib    Pain Score: 0/10    Is the Patient Confused:  No    Last Food or Drink: 7/14/22 at 1700    Focused Assessment:  Yesterday, pt developed generalized weakness and fatigue. She was found to be tachycardic in the 150s in triage. She denies any palpitations or chest pain. She was sent here from urgent care and told her blood count is low and that she needs a blood transfusion. Hemoglobin 4.9. 2 Units of blood given to the patient.     Tests Performed: Done: Labs and Imaging    Treatments Provided:  Blood transfusion    Family Dynamics/Concerns: No    Family Updated On Visitor Policy: Yes    Plan of Care Communicated to Family: Yes    Who Was Updated about Plan of Care: daughter    Belongings Checklist Done and Signed by Patient: Yes    Medications sent with patient: none    Covid: asymptomatic , negative    Additional Information:     RN: Bhavik Barron RN   7/15/2022 2:11 AM

## 2022-07-15 NOTE — CONSULTS
HEART CARE ENCOUNTER CONSULTATON NOTE      [facility]  [phone]      Assessment/Recommendations   Assessment:   1.  New onset A. fib with rapid ventricular response  2.  Severe iron deficiency anemia.  Hemoglobin 4.9 on arrival.  MCV 64      Plan:   1.  Give additional metoprolol this morning at 12.5 mg oral.  Change metoprolol to 25 mg 4 times daily.  Hold if blood pressure less than 95 mmHg.  Systolically.  Hold if heart rate less than 60.  2.  Strongly recommend against anticoagulation due to concern for chronic bleed as a source of severe anemia  3.  Complete echo  4.  Patient appears to be compensated based on examination.  Could undergo emergent EGD or colonoscopy if required         History of Present Illness/Subjective    HPI: Alistair Cuevas is a 72 year old female history of iron deficiency anemia, hypertension dementia who presents to [facility] with severe anemia.  Found to be in A. fib with rapid trickle response.  Initial hemoglobin was 4.7.  She currently has some dyspnea on exertion via .  She has noticed some cough.  She is a poor historian.  No active chest pain at this time.  No active abdominal pain.  No history of overt bleeding or melena.  She denies a fever chills or sweats.  No lower extremity edema.  No significant murmurs on examination.    She has no prior cardiac surgeries.  No family history of premature coronary disease in her parents per patient.     used for communication.    Echocardiogram Results: Ending    Recent Coronary Angiogram Results: No prior    EKG: From 7/14/2022: A. fib ventricular response.  Nonspecific ST and T wave abnormality.  No significant ST depressions or elevations.       Physical Examination  Review of Systems   Vitals: /68   Pulse 116   Temp 97.8  F (36.6  C) (Oral)   Resp 20   Ht 1.524 m (5')   Wt 54.4 kg (120 lb)   SpO2 100%   BMI 23.44 kg/m    BMI= Body mass index is 23.44 kg/m .  Wt Readings  from Last 3 Encounters:   07/14/22 54.4 kg (120 lb)       General Appearance:   no distress, normal body habitus   ENT/Mouth: membranes moist, no oral lesions or bleeding gums.      EYES:  no scleral icterus, normal conjunctivae   Neck: no carotid bruits or thyromegaly   Chest/Lungs:   lungs are clear to auscultation, no rales or wheezing, no sternal scar, equal chest wall expansion    Cardiovascular:   Irregular. Normal first and second heart sounds with no murmurs, rubs, or gallops; the carotid, radial and posterior tibial pulses are intact, Jugular venous pressure noraml, no edema bilaterally    Abdomen:  no organomegaly, masses, bruits, or tenderness; bowel sounds are present   Extremities: no cyanosis or clubbing   Skin: no xanthelasma, warm.    Neurologic: normal  bilateral, no tremors     Psychiatric: alert and oriented x3, calm        Please refer above for cardiac ROS details.        Medical History  Surgical History Family History Social History   History reviewed. No pertinent past medical history.  History reviewed. No pertinent surgical history.  No family history on file.     Social History     Socioeconomic History     Marital status: Single     Spouse name: Not on file     Number of children: Not on file     Years of education: Not on file     Highest education level: Not on file   Occupational History     Not on file   Tobacco Use     Smoking status: Not on file     Smokeless tobacco: Not on file   Substance and Sexual Activity     Alcohol use: Not on file     Drug use: Not on file     Sexual activity: Not on file   Other Topics Concern     Not on file   Social History Narrative     Not on file     Social Determinants of Health     Financial Resource Strain: Not on file   Food Insecurity: Not on file   Transportation Needs: Not on file   Physical Activity: Not on file   Stress: Not on file   Social Connections: Not on file   Intimate Partner Violence: Not on file   Housing Stability: Not on file            Medications  Allergies   Current Outpatient Medications   Medication Sig Dispense Refill     acetaminophen (TYLENOL) 325 MG tablet Take 325-650 mg by mouth every 6 hours as needed for mild pain       ibuprofen (ADVIL/MOTRIN) 200 MG tablet Take 400 mg by mouth every 4 hours as needed for mild pain       No Known Allergies       Lab Results    Chemistry/lipid CBC Cardiac Enzymes/BNP/TSH/INR   No results for input(s): CHOL, HDL, LDL, TRIG, CHOLHDLRATIO in the last 96645 hours.  No results for input(s): LDL in the last 07534 hours.  Recent Labs   Lab Test 07/14/22 2055      POTASSIUM 3.6   CHLORIDE 106   CO2 23   *   BUN 7*   CR 0.77   GFRESTIMATED 81   SUDEEP 9.9     Recent Labs   Lab Test 07/14/22 2055   CR 0.77     No results for input(s): A1C in the last 85224 hours.       Recent Labs   Lab Test 07/15/22  0559   WBC 6.6   HGB 8.5*   HCT 30.5*   MCV 72*        Recent Labs   Lab Test 07/15/22  0559 07/14/22  2055   HGB 8.5* 4.9*    No results for input(s): TROPONINI in the last 05382 hours.  No results for input(s): BNP, NTBNPI, NTBNP in the last 47662 hours.  Recent Labs   Lab Test 07/14/22 2055   TSH 0.97     No results for input(s): INR in the last 32772 hours.     Juanjo Pulliam DO

## 2022-07-15 NOTE — PHARMACY-ADMISSION MEDICATION HISTORY
Pharmacy Note - Admission Medication History    Pertinent Provider Information: Patient should be taking iron pills but is not taking.      ______________________________________________________________________    Prior To Admission (PTA) med list completed and updated in EMR.       PTA Med List   Medication Sig Last Dose     acetaminophen (TYLENOL) 325 MG tablet Take 325-650 mg by mouth every 6 hours as needed for mild pain Past Month at Unknown time     ibuprofen (ADVIL/MOTRIN) 200 MG tablet Take 400 mg by mouth every 4 hours as needed for mild pain Past Month at Unknown time       Information source(s): Patient, Family member and [facility]  Method of interview communication: in-person    Summary of Changes to PTA Med List  New: all medications  Discontinued: none  Changed: none    Patient was asked about OTC/herbal products specifically.  PTA med list reflects this.    In the past week, patient estimated taking medication this percent of the time:  50-90% due to other.    Allergies were reviewed, assessed, and updated with the patient.      Patient does not use any multi-dose medications prior to admission.    The information provided in this note is only as accurate as the sources available at the time of the update(s).    Thank you for the opportunity to participate in the care of this patient.    Janessa Carlson [facility]  7/14/2022 10:28 PM

## 2022-07-15 NOTE — UTILIZATION REVIEW
Admission Status; Secondary Review Determination   Under the authority of the Utilization Management Committee, the utilization review process indicated a secondary review on Alistair Cuevas. The review outcome is based on review of the medical records, discussions with staff, and applying clinical experience noted on the date of the review.   (x) Inpatient Status Appropriate - This patient's medical care is consistent with medical management for inpatient care and reasonable inpatient medical practice.     RATIONALE FOR DETERMINATION   72 yr old female with HTN, dementia, GEOFFREY presented to ED with SMITH and noted profound anemia 4.7.  More notable was new diagnosis Afib with RVR.  Has been transfused to 8.5 but still with HR control issues and currently 120s.  Cardiology consulting and started oral medications and dose titration.  Monitoring for hypotension given possible GI bleeding.  GI has seen and recommended EGD and colonoscopy however patient declined.      At the time of admission with the information available to the attending physician more than 2 nights Hospital complex care was anticipated, based on patient risk of adverse outcome if treated as outpatient and complex care required. Inpatient admission is appropriate based on the Medicare guidelines.   The information on this document is developed by the utilization review team in order for the business office to ensure compliance. This only denotes the appropriateness of proper admission status and does not reflect the quality of care rendered.   The definitions of Inpatient Status and Observation Status used in making the determination above are those provided in the CMS Coverage Manual, Chapter 1 and Chapter 6, section 70.4.   Sincerely,   Mendy German MD  Utilization Review  Physician Advisor  [facility]

## 2022-07-15 NOTE — ED PROVIDER NOTES
EMERGENCY DEPARTMENT ENCOUNTER      NAME: Alistair Cuevas  AGE: 72 year old female  YOB: 1950  MRN: [id number]  EVALUATION DATE & TIME: 7/14/2022  9:33 PM    PCP: No Ref-Primary, Physician    ED PROVIDER: Pankaj Roche M.D.    Chief Complaint   Patient presents with     Abnormal Labs     Tachycardia     Fatigue       FINAL IMPRESSION:  1. Iron deficiency anemia, unspecified iron deficiency anemia type    2. Atrial fibrillation with RVR (H)        ED COURSE & MEDICAL DECISION MAKING:    Pertinent Labs & Imaging studies personally reviewed and interpreted by me. (See chart for details)  9:58 PM Patient seen and examined, prior records reviewed.  Differential diagnosis includes but not limited to pneumonia, sepsis, urinary tract infection, intra-abdominal infection, medication reaction, electrolyte disturbance, anemia, dysrhythmia, myocardial infarction.  Patient with history of iron deficiency anemia although I cannot find recent labs, comes in with generalized weakness after being seen at urgent care and found to be anemic.  Labs from that visit are not immediately available.  Repeat labs done in the emergency department demonstrate hemoglobin of 4.9.  Patient appears pale and she is tachycardic.  No respiratory distress and blood pressure is normal.  IV fluids are ordered along with 2 units of packed red blood cells and plan for admission.  Denies abdominal pain, black stools, hematemesis.  Symptoms are most likely related to her iron deficiency anemia.  Patient has a history of atrial fibrillation and is currently in atrial fibrillation with rapid ventricular response.  This may be compensatory for her anemia.  She is not anticoagulated and has no medication for rate control.  I do not see any mention of atrial fibrillation in her chart.  10:45 PM remaining labs are reassuring including normal LFTs.  Iron studies are ordered and are pending.  Patient will be admitted for further evaluation for source  of anemia although would likely iron deficiency.  Care was discussed with Dr. Quiros for admission, cardiac telemetry.    At the conclusion of the encounter I discussed the results of all of the tests and the disposition. The questions were answered. The patient or family acknowledged understanding and was agreeable with the care plan.     PROCEDURES:   Procedures    Critical Care     Performed by: Dr Pankaj Roche  Authorized by: Dr Pankaj Roche  Total critical care time: 40 minutes  Critical care was necessary to treat or prevent imminent or life-threatening deterioration of the following conditions:  Anemia requiring transfusion  Critical care was time spent personally by me on the following activities: development of treatment plan with patient or surrogate, discussions with consultants, examination of patient, evaluation of patient's response to treatment, obtaining history from patient or surrogate, ordering and performing treatments and interventions, ordering and review of laboratory studies, ordering and review of radiographic studies, re-evaluation of patient's condition and monitoring for potential decompensation.  Critical care time was exclusive of separately billable procedures and treating other patients.      MEDICATIONS GIVEN IN THE EMERGENCY:  Medications   melatonin tablet 1 mg (has no administration in time range)   ondansetron (ZOFRAN ODT) ODT tab 4 mg (has no administration in time range)     Or   ondansetron (ZOFRAN) injection 4 mg (has no administration in time range)   acetaminophen (TYLENOL) tablet 650 mg (has no administration in time range)     Or   acetaminophen (TYLENOL) Suppository 650 mg (has no administration in time range)   hydrOXYzine (ATARAX) tablet 10 mg (has no administration in time range)   sodium chloride 0.9% infusion (has no administration in time range)   pantoprazole (PROTONIX) IV push injection 40 mg (40 mg Intravenous Given 7/14/22 2237)   0.9% sodium chloride  "BOLUS (0 mLs Intravenous Stopped 7/14/22 2300)       NEW PRESCRIPTIONS STARTED AT TODAY'S ER VISIT  New Prescriptions    No medications on file       =================================================================    HPI    Patient information was obtained from: Patient, family member      Alistair Cuevas is a 72 year old female who presents to this ED via walk-in for evaluation of abnormal labs, tachycardia, fatigue.    Family member reports patient had a cough 2 weeks ago which has since resolved, and dizziness, fatigue and leg weakness which began yesterday. Family member also reports patient has had low hemoglobin \"for a while\" and has not had blood transfusions in the past.    Patient denies NVD, chest pain, shortness of breath, melena and all other relevant symptoms.      REVIEW OF SYSTEMS   Review of Systems   Constitutional: Positive for fatigue.   Respiratory: Positive for cough (2 weeks ago, resolved). Negative for shortness of breath.    Cardiovascular: Negative for chest pain.   Gastrointestinal: Negative for blood in stool, diarrhea, nausea and vomiting.   Neurological: Positive for dizziness and weakness (Legs).   All other systems reviewed and are negative.     All other systems reviewed and negative    PAST MEDICAL HISTORY:  History reviewed. No pertinent past medical history.    PAST SURGICAL HISTORY:  History reviewed. No pertinent surgical history.    CURRENT MEDICATIONS:    Current Facility-Administered Medications   Medication     acetaminophen (TYLENOL) tablet 650 mg    Or     acetaminophen (TYLENOL) Suppository 650 mg     hydrOXYzine (ATARAX) tablet 10 mg     melatonin tablet 1 mg     ondansetron (ZOFRAN ODT) ODT tab 4 mg    Or     ondansetron (ZOFRAN) injection 4 mg     sodium chloride 0.9% infusion     Current Outpatient Medications   Medication     acetaminophen (TYLENOL) 325 MG tablet     ibuprofen (ADVIL/MOTRIN) 200 MG tablet       ALLERGIES:  No Known Allergies    FAMILY HISTORY:  No family " history on file.    SOCIAL HISTORY:   Social History     Socioeconomic History     Marital status: Single       VITALS:  /71   Pulse 120   Temp 98.4  F (36.9  C) (Oral)   Resp 16   Ht 1.524 m (5')   Wt 54.4 kg (120 lb)   SpO2 98%   BMI 23.44 kg/m      PHYSICAL EXAM:  Physical Exam  Vitals and nursing note reviewed.   Constitutional:       Appearance: Normal appearance.   HENT:      Head: Normocephalic and atraumatic.      Right Ear: External ear normal.      Left Ear: External ear normal.      Nose: Nose normal.      Mouth/Throat:      Mouth: Mucous membranes are moist.   Eyes:      Extraocular Movements: Extraocular movements intact.      Conjunctiva/sclera: Conjunctivae normal.      Pupils: Pupils are equal, round, and reactive to light.   Cardiovascular:      Rate and Rhythm: Tachycardia present. Rhythm irregular.   Pulmonary:      Effort: Pulmonary effort is normal.      Breath sounds: Normal breath sounds. No wheezing or rales.   Abdominal:      General: Abdomen is flat. There is no distension.      Palpations: Abdomen is soft.      Tenderness: There is no abdominal tenderness. There is no guarding.   Musculoskeletal:         General: Normal range of motion.      Cervical back: Normal range of motion and neck supple.      Right lower leg: No edema.      Left lower leg: No edema.   Lymphadenopathy:      Cervical: No cervical adenopathy.   Skin:     General: Skin is warm and dry.      Coloration: Skin is pale.   Neurological:      General: No focal deficit present.      Mental Status: She is alert and oriented to person, place, and time. Mental status is at baseline.      Comments: No gross focal neurologic deficits   Psychiatric:         Mood and Affect: Mood normal.         Behavior: Behavior normal.         Thought Content: Thought content normal.          LAB:  All pertinent labs reviewed and interpreted.  Results for orders placed or performed during the hospital encounter of 07/14/22   CBC with  platelets and differential   Result Value Ref Range    WBC Count 5.7 4.0 - 11.0 10e3/uL    RBC Count 3.11 (L) 3.80 - 5.20 10e6/uL    Hemoglobin 4.9 (LL) 11.7 - 15.7 g/dL    Hematocrit 19.8 (L) 35.0 - 47.0 %    MCV 64 (L) 78 - 100 fL    MCH 15.8 (L) 26.5 - 33.0 pg    MCHC 24.7 (L) 31.5 - 36.5 g/dL    RDW 22.5 (H) 10.0 - 15.0 %    Platelet Count 158 150 - 450 10e3/uL    % Neutrophils 62 %    % Lymphocytes 27 %    % Monocytes 9 %    % Eosinophils 1 %    % Basophils 0 %    % Immature Granulocytes 1 %    NRBCs per 100 WBC 2 (H) <1 /100    Absolute Neutrophils 3.5 1.6 - 8.3 10e3/uL    Absolute Lymphocytes 1.5 0.8 - 5.3 10e3/uL    Absolute Monocytes 0.5 0.0 - 1.3 10e3/uL    Absolute Eosinophils 0.1 0.0 - 0.7 10e3/uL    Absolute Basophils 0.0 0.0 - 0.2 10e3/uL    Absolute Immature Granulocytes 0.1 <=0.4 10e3/uL    Absolute NRBCs 0.1 10e3/uL   Extra Blue Top Tube   Result Value Ref Range    Hold Specimen [facility]    Extra Red Top Tube   Result Value Ref Range    Hold Specimen [facility]    Extra Green Top (Lithium Heparin) Tube   Result Value Ref Range    Hold Specimen [facility]    Extra Blood Bank Purple Top Tube   Result Value Ref Range    Hold Specimen [facility]    Basic metabolic panel   Result Value Ref Range    Sodium 136 136 - 145 mmol/L    Potassium 3.6 3.5 - 5.0 mmol/L    Chloride 106 98 - 107 mmol/L    Carbon Dioxide (CO2) 23 22 - 31 mmol/L    Anion Gap 7 5 - 18 mmol/L    Urea Nitrogen 7 (L) 8 - 28 mg/dL    Creatinine 0.77 0.60 - 1.10 mg/dL    Calcium 9.9 8.5 - 10.5 mg/dL    Glucose 145 (H) 70 - 125 mg/dL    GFR Estimate 81 >60 mL/min/1.73m2   Asymptomatic COVID-19 Virus (Coronavirus) by PCR Nasopharyngeal    Specimen: Nasopharyngeal; Swab   Result Value Ref Range    SARS CoV2 PCR Negative Negative   Hepatic panel   Result Value Ref Range    Bilirubin Total 0.7 0.0 - 1.0 mg/dL    Bilirubin Direct 0.2 <=0.5 mg/dL    Protein Total 7.2 6.0 - 8.0 g/dL    Albumin 3.3 (L) 3.5 - 5.0 g/dL    Alkaline Phosphatase 111 45 - 120 U/L    AST 33 0 - 40  U/L    ALT 23 0 - 45 U/L   Result Value Ref Range    TSH 0.97 0.30 - 5.00 uIU/mL   Adult Type and Screen   Result Value Ref Range    ABO/RH(D) B POS     Antibody Screen Negative Negative    SPECIMEN EXPIRATION DATE 20220717235900    ABO and Rh   Result Value Ref Range    ABO/RH(D) B POS     SPECIMEN EXPIRATION DATE [id number]    Prepare red blood cells (unit)   Result Value Ref Range    CROSSMATCH Compatible     UNIT ABO/RH B Pos     Unit Number [id number]     Unit Status Issued     Blood Component Type Red Blood Cells     Product Code [id number]     CODING SYSTEM [id number]     UNIT TYPE ISBT 7300     ISSUE DATE AND TIME [id number]    Prepare red blood cells (unit)   Result Value Ref Range    CROSSMATCH Compatible     UNIT ABO/RH B Pos     Unit Number [id number]     Unit Status Ready     Blood Component Type Red Blood Cells     Product Code [id number]     CODING SYSTEM [id number]     UNIT TYPE ISBT 7300        RADIOLOGY:  Reviewed all pertinent imaging. Please see official radiology report.  Echocardiogram Complete    (Results Pending)   CT Abdomen Pelvis w Contrast    (Results Pending)       EKG:    Performed at: 8:07 PM  Impression: Atrial fibrillation with rapid ventricular response  Rate: 130  Rhythm: Atrial fibrillation  Axis: Normal  QRS Interval: 90  QTc Interval: 459  ST Changes: No acute ischemic change  Comparison: No prior    I have independently reviewed and interpreted the EKG(s) documented above.    I, Ash Bradford, am serving as a scribe to document services personally performed by Dr. Roche based on my observation and the provider's statements to me. I, Pankaj Roche MD attest that Ash Bradford is acting in a scribe capacity, has observed my performance of the services and has documented them in accordance with my direction.    Pankaj Roche M.D.  Emergency Medicine  [facility] EMERGENCY DEPARTMENT  [address]  [place] [address]  [phone]  Dept: [phone]       Pankaj Roche MD  07/15/22 0030

## 2022-07-15 NOTE — ED TRIAGE NOTES
Yesterday, pt developed generalized weakness and fatigue. She was found to be tachycardic in the 150s in triage. She denies any palpitations or chest pain. She was sent here from urgent care and told her blood count is low and that she needs a blood transfusion, no paperwork from urgent care present.      Triage Assessment     Row Name 07/14/22 1956       Triage Assessment (Adult)    Airway WDL WDL       Respiratory WDL    Respiratory WDL WDL       Skin Circulation/Temperature WDL    Skin Circulation/Temperature WDL WDL       Cardiac WDL    Cardiac WDL rhythm    Cardiac Rhythm tachycardic       Peripheral/Neurovascular WDL    Peripheral Neurovascular WDL WDL       Cognitive/Neuro/Behavioral WDL    Cognitive/Neuro/Behavioral WDL WDL

## 2025-05-06 ENCOUNTER — HOSPITAL ENCOUNTER (INPATIENT)
Facility: HOSPITAL | Age: 75
LOS: 1 days | Discharge: LEFT BEFORE DISCHARGE | End: 2025-05-07
Attending: EMERGENCY MEDICINE
Payer: COMMERCIAL

## 2025-05-06 ENCOUNTER — APPOINTMENT (OUTPATIENT)
Dept: CARDIOLOGY | Facility: HOSPITAL | Age: 75
DRG: 812 | End: 2025-05-06
Payer: COMMERCIAL

## 2025-05-06 DIAGNOSIS — D50.9 IRON DEFICIENCY ANEMIA, UNSPECIFIED IRON DEFICIENCY ANEMIA TYPE: ICD-10-CM

## 2025-05-06 DIAGNOSIS — I48.91 ATRIAL FIBRILLATION WITH RVR (H): Primary | ICD-10-CM

## 2025-05-06 LAB
ABO + RH BLD: NORMAL
ACANTHOCYTES BLD QL SMEAR: SLIGHT
ALBUMIN SERPL BCG-MCNC: 3.4 G/DL (ref 3.5–5.2)
ALBUMIN UR-MCNC: NEGATIVE MG/DL
ALP SERPL-CCNC: 232 U/L (ref 40–150)
ALT SERPL W P-5'-P-CCNC: 21 U/L (ref 0–50)
AMORPH CRY #/AREA URNS HPF: ABNORMAL /HPF
ANION GAP SERPL CALCULATED.3IONS-SCNC: 5 MMOL/L (ref 7–15)
APPEARANCE UR: ABNORMAL
AST SERPL W P-5'-P-CCNC: 24 U/L (ref 0–45)
BACTERIA #/AREA URNS HPF: ABNORMAL /HPF
BASOPHILS # BLD AUTO: 0 10E3/UL (ref 0–0.2)
BASOPHILS NFR BLD AUTO: 0 %
BILIRUB SERPL-MCNC: 1.4 MG/DL
BILIRUB UR QL STRIP: NEGATIVE
BLD GP AB SCN SERPL QL: NEGATIVE
BLD PROD TYP BPU: NORMAL
BLD PROD TYP BPU: NORMAL
BLOOD COMPONENT TYPE: NORMAL
BLOOD COMPONENT TYPE: NORMAL
BUN SERPL-MCNC: 8.7 MG/DL (ref 8–23)
CALCIUM SERPL-MCNC: 10.1 MG/DL (ref 8.8–10.4)
CHLORIDE SERPL-SCNC: 108 MMOL/L (ref 98–107)
CODING SYSTEM: NORMAL
CODING SYSTEM: NORMAL
COLOR UR AUTO: ABNORMAL
CREAT SERPL-MCNC: 0.65 MG/DL (ref 0.51–0.95)
CROSSMATCH: NORMAL
CROSSMATCH: NORMAL
DACRYOCYTES BLD QL SMEAR: SLIGHT
EGFRCR SERPLBLD CKD-EPI 2021: >90 ML/MIN/1.73M2
ELLIPTOCYTES BLD QL SMEAR: SLIGHT
EOSINOPHIL # BLD AUTO: 0.1 10E3/UL (ref 0–0.7)
EOSINOPHIL NFR BLD AUTO: 2 %
ERYTHROCYTE [DISTWIDTH] IN BLOOD BY AUTOMATED COUNT: 21.7 % (ref 10–15)
FERRITIN SERPL-MCNC: 4 NG/ML (ref 11–328)
FOLATE SERPL-MCNC: 9.3 NG/ML (ref 4.6–34.8)
GLUCOSE SERPL-MCNC: 105 MG/DL (ref 70–99)
GLUCOSE UR STRIP-MCNC: NEGATIVE MG/DL
HCO3 SERPL-SCNC: 25 MMOL/L (ref 22–29)
HCT VFR BLD AUTO: 18.8 % (ref 35–47)
HGB BLD-MCNC: 4 G/DL (ref 11.7–15.7)
HGB BLD-MCNC: 7.6 G/DL (ref 11.7–15.7)
HGB UR QL STRIP: NEGATIVE
HOLD SPECIMEN: NORMAL
HOLD SPECIMEN: NORMAL
IMM GRANULOCYTES # BLD: 0 10E3/UL
IMM GRANULOCYTES NFR BLD: 0 %
ISSUE DATE AND TIME: NORMAL
ISSUE DATE AND TIME: NORMAL
KETONES UR STRIP-MCNC: NEGATIVE MG/DL
LACTATE SERPL-SCNC: 1.8 MMOL/L (ref 0.7–2)
LEUKOCYTE ESTERASE UR QL STRIP: ABNORMAL
LIPASE SERPL-CCNC: 53 U/L (ref 13–60)
LVEF ECHO: NORMAL
LYMPHOCYTES # BLD AUTO: 1.2 10E3/UL (ref 0.8–5.3)
LYMPHOCYTES NFR BLD AUTO: 24 %
MCH RBC QN AUTO: 13.6 PG (ref 26.5–33)
MCHC RBC AUTO-ENTMCNC: 21.3 G/DL (ref 31.5–36.5)
MCV RBC AUTO: 64 FL (ref 78–100)
MONOCYTES # BLD AUTO: 0.4 10E3/UL (ref 0–1.3)
MONOCYTES NFR BLD AUTO: 8 %
MUCOUS THREADS #/AREA URNS LPF: PRESENT /LPF
NEUTROPHILS # BLD AUTO: 3.4 10E3/UL (ref 1.6–8.3)
NEUTROPHILS NFR BLD AUTO: 66 %
NITRATE UR QL: POSITIVE
NRBC # BLD AUTO: 0 10E3/UL
NRBC BLD AUTO-RTO: 1 /100
NT-PROBNP SERPL-MCNC: 735 PG/ML (ref 0–1800)
PH UR STRIP: 7 [PH] (ref 5–7)
PLAT MORPH BLD: ABNORMAL
PLATELET # BLD AUTO: 173 10E3/UL (ref 150–450)
POLYCHROMASIA BLD QL SMEAR: SLIGHT
POTASSIUM SERPL-SCNC: 4.1 MMOL/L (ref 3.4–5.3)
PROT SERPL-MCNC: 7.1 G/DL (ref 6.4–8.3)
RBC # BLD AUTO: 2.94 10E6/UL (ref 3.8–5.2)
RBC MORPH BLD: ABNORMAL
RBC URINE: 0 /HPF
SODIUM SERPL-SCNC: 138 MMOL/L (ref 135–145)
SP GR UR STRIP: 1.01 (ref 1–1.03)
SPECIMEN EXP DATE BLD: NORMAL
SQUAMOUS EPITHELIAL: 1 /HPF
TARGETS BLD QL SMEAR: SLIGHT
UNIT ABO/RH: NORMAL
UNIT ABO/RH: NORMAL
UNIT NUMBER: NORMAL
UNIT NUMBER: NORMAL
UNIT STATUS: NORMAL
UNIT STATUS: NORMAL
UNIT TYPE ISBT: 7300
UNIT TYPE ISBT: 7300
UROBILINOGEN UR STRIP-MCNC: NORMAL MG/DL
VIT B12 SERPL-MCNC: 326 PG/ML (ref 232–1245)
WBC # BLD AUTO: 5.2 10E3/UL (ref 4–11)
WBC URINE: 33 /HPF

## 2025-05-06 PROCEDURE — 99254 IP/OBS CNSLTJ NEW/EST MOD 60: CPT | Performed by: INTERNAL MEDICINE

## 2025-05-06 PROCEDURE — 81001 URINALYSIS AUTO W/SCOPE: CPT

## 2025-05-06 PROCEDURE — 93306 TTE W/DOPPLER COMPLETE: CPT

## 2025-05-06 PROCEDURE — 36415 COLL VENOUS BLD VENIPUNCTURE: CPT

## 2025-05-06 PROCEDURE — 85018 HEMOGLOBIN: CPT

## 2025-05-06 PROCEDURE — 120N000001 HC R&B MED SURG/OB

## 2025-05-06 PROCEDURE — 86901 BLOOD TYPING SEROLOGIC RH(D): CPT | Performed by: EMERGENCY MEDICINE

## 2025-05-06 PROCEDURE — 86923 COMPATIBILITY TEST ELECTRIC: CPT | Performed by: EMERGENCY MEDICINE

## 2025-05-06 PROCEDURE — 87086 URINE CULTURE/COLONY COUNT: CPT

## 2025-05-06 PROCEDURE — 86923 COMPATIBILITY TEST ELECTRIC: CPT

## 2025-05-06 PROCEDURE — 83690 ASSAY OF LIPASE: CPT | Performed by: EMERGENCY MEDICINE

## 2025-05-06 PROCEDURE — 99291 CRITICAL CARE FIRST HOUR: CPT | Mod: 25

## 2025-05-06 PROCEDURE — 83880 ASSAY OF NATRIURETIC PEPTIDE: CPT

## 2025-05-06 PROCEDURE — 36415 COLL VENOUS BLD VENIPUNCTURE: CPT | Performed by: EMERGENCY MEDICINE

## 2025-05-06 PROCEDURE — 93005 ELECTROCARDIOGRAM TRACING: CPT | Performed by: EMERGENCY MEDICINE

## 2025-05-06 PROCEDURE — 250N000013 HC RX MED GY IP 250 OP 250 PS 637

## 2025-05-06 PROCEDURE — 82607 VITAMIN B-12: CPT

## 2025-05-06 PROCEDURE — 250N000011 HC RX IP 250 OP 636: Performed by: FAMILY MEDICINE

## 2025-05-06 PROCEDURE — 82746 ASSAY OF FOLIC ACID SERUM: CPT

## 2025-05-06 PROCEDURE — 83605 ASSAY OF LACTIC ACID: CPT | Performed by: EMERGENCY MEDICINE

## 2025-05-06 PROCEDURE — 82247 BILIRUBIN TOTAL: CPT | Performed by: EMERGENCY MEDICINE

## 2025-05-06 PROCEDURE — 93306 TTE W/DOPPLER COMPLETE: CPT | Mod: 26 | Performed by: INTERNAL MEDICINE

## 2025-05-06 PROCEDURE — P9016 RBC LEUKOCYTES REDUCED: HCPCS | Performed by: EMERGENCY MEDICINE

## 2025-05-06 PROCEDURE — 82728 ASSAY OF FERRITIN: CPT

## 2025-05-06 PROCEDURE — 99223 1ST HOSP IP/OBS HIGH 75: CPT | Mod: AI

## 2025-05-06 PROCEDURE — 85025 COMPLETE CBC W/AUTO DIFF WBC: CPT | Performed by: EMERGENCY MEDICINE

## 2025-05-06 PROCEDURE — 36430 TRANSFUSION BLD/BLD COMPNT: CPT

## 2025-05-06 RX ORDER — ACETAMINOPHEN 650 MG/1
650 SUPPOSITORY RECTAL EVERY 4 HOURS PRN
Status: DISCONTINUED | OUTPATIENT
Start: 2025-05-06 | End: 2025-05-07 | Stop reason: HOSPADM

## 2025-05-06 RX ORDER — ONDANSETRON 4 MG/1
4 TABLET, ORALLY DISINTEGRATING ORAL EVERY 6 HOURS PRN
Status: DISCONTINUED | OUTPATIENT
Start: 2025-05-06 | End: 2025-05-07 | Stop reason: HOSPADM

## 2025-05-06 RX ORDER — FUROSEMIDE 10 MG/ML
20 INJECTION INTRAMUSCULAR; INTRAVENOUS ONCE
Status: COMPLETED | OUTPATIENT
Start: 2025-05-06 | End: 2025-05-06

## 2025-05-06 RX ORDER — METOPROLOL SUCCINATE 50 MG/1
50 TABLET, EXTENDED RELEASE ORAL DAILY
Status: DISCONTINUED | OUTPATIENT
Start: 2025-05-06 | End: 2025-05-07 | Stop reason: HOSPADM

## 2025-05-06 RX ORDER — PROCHLORPERAZINE MALEATE 5 MG/1
5 TABLET ORAL EVERY 6 HOURS PRN
Status: DISCONTINUED | OUTPATIENT
Start: 2025-05-06 | End: 2025-05-07 | Stop reason: HOSPADM

## 2025-05-06 RX ORDER — ENOXAPARIN SODIUM 100 MG/ML
40 INJECTION SUBCUTANEOUS EVERY 24 HOURS
Status: DISCONTINUED | OUTPATIENT
Start: 2025-05-06 | End: 2025-05-07 | Stop reason: HOSPADM

## 2025-05-06 RX ORDER — AMOXICILLIN 250 MG
1 CAPSULE ORAL 2 TIMES DAILY PRN
Status: DISCONTINUED | OUTPATIENT
Start: 2025-05-06 | End: 2025-05-07 | Stop reason: HOSPADM

## 2025-05-06 RX ORDER — LIDOCAINE 40 MG/G
CREAM TOPICAL
Status: DISCONTINUED | OUTPATIENT
Start: 2025-05-06 | End: 2025-05-07 | Stop reason: HOSPADM

## 2025-05-06 RX ORDER — ONDANSETRON 2 MG/ML
4 INJECTION INTRAMUSCULAR; INTRAVENOUS EVERY 6 HOURS PRN
Status: DISCONTINUED | OUTPATIENT
Start: 2025-05-06 | End: 2025-05-07 | Stop reason: HOSPADM

## 2025-05-06 RX ORDER — CALCIUM CARBONATE 500 MG/1
1000 TABLET, CHEWABLE ORAL 4 TIMES DAILY PRN
Status: DISCONTINUED | OUTPATIENT
Start: 2025-05-06 | End: 2025-05-07 | Stop reason: HOSPADM

## 2025-05-06 RX ORDER — POLYETHYLENE GLYCOL 3350 17 G/17G
17 POWDER, FOR SOLUTION ORAL 2 TIMES DAILY PRN
Status: DISCONTINUED | OUTPATIENT
Start: 2025-05-06 | End: 2025-05-07 | Stop reason: HOSPADM

## 2025-05-06 RX ORDER — ACETAMINOPHEN 325 MG/1
650 TABLET ORAL EVERY 4 HOURS PRN
Status: DISCONTINUED | OUTPATIENT
Start: 2025-05-06 | End: 2025-05-07 | Stop reason: HOSPADM

## 2025-05-06 RX ORDER — AMOXICILLIN 250 MG
2 CAPSULE ORAL 2 TIMES DAILY PRN
Status: DISCONTINUED | OUTPATIENT
Start: 2025-05-06 | End: 2025-05-07 | Stop reason: HOSPADM

## 2025-05-06 RX ADMIN — FUROSEMIDE 20 MG: 10 INJECTION, SOLUTION INTRAMUSCULAR; INTRAVENOUS at 17:20

## 2025-05-06 RX ADMIN — METOPROLOL SUCCINATE 50 MG: 50 TABLET, EXTENDED RELEASE ORAL at 14:09

## 2025-05-06 ASSESSMENT — ACTIVITIES OF DAILY LIVING (ADL)
ADLS_ACUITY_SCORE: 44
HEARING_DIFFICULTY_OR_DEAF: NO
ADLS_ACUITY_SCORE: 44
WALKING_OR_CLIMBING_STAIRS: AMBULATION DIFFICULTY, REQUIRES EQUIPMENT
ADLS_ACUITY_SCORE: 44
VISION_MANAGEMENT: READING
ADLS_ACUITY_SCORE: 44
ADLS_ACUITY_SCORE: 41
ADLS_ACUITY_SCORE: 41
ADLS_ACUITY_SCORE: 44
FALL_HISTORY_WITHIN_LAST_SIX_MONTHS: NO
ADLS_ACUITY_SCORE: 44
ADLS_ACUITY_SCORE: 43
DOING_ERRANDS_INDEPENDENTLY_DIFFICULTY: OTHER (SEE COMMENTS)
ADLS_ACUITY_SCORE: 44
WEAR_GLASSES_OR_BLIND: YES
ADLS_ACUITY_SCORE: 43
DRESSING/BATHING_DIFFICULTY: NO
DIFFICULTY_COMMUNICATING: YES
CONCENTRATING,_REMEMBERING_OR_MAKING_DECISIONS_DIFFICULTY: YES
ADLS_ACUITY_SCORE: 44
ADLS_ACUITY_SCORE: 43
ADLS_ACUITY_SCORE: 43
TOILETING_ISSUES: NO
WALKING_OR_CLIMBING_STAIRS_DIFFICULTY: YES
DIFFICULTY_EATING/SWALLOWING: NO
ADLS_ACUITY_SCORE: 44
CHANGE_IN_FUNCTIONAL_STATUS_SINCE_ONSET_OF_CURRENT_ILLNESS/INJURY: NO
EQUIPMENT_CURRENTLY_USED_AT_HOME: CANE, STRAIGHT;WALKER, ROLLING

## 2025-05-06 NOTE — PLAN OF CARE
Alert and occasionally forgetful. Son says has struggled with anxiety for decades. also has refused to take iron supplements in the past. Denies nausea and pain, although pt comes up from ED with two emesis bags. Sinus tachycardia on telemetry. Blood completed after arrival to P2. Denies all reaction symptoms, and other than heart rate about 120 , vitals in normal ranges. Daughter at bedside assisting with commode. Hgb rechecked at 7.6. Pt reported later that she felt fine, and nothing was bothering her. Good fluid intake and some food intake.

## 2025-05-06 NOTE — PHARMACY-ADMISSION MEDICATION HISTORY
Pharmacist Admission Medication History    Admission medication history is complete. The information provided in this note is only as accurate as the sources available at the time of the update.    Information Source(s): Family member, Clinic records, and CareEverywhere/SureScripts via in-person    Pertinent Information: Patient's was able to confirm current medications and last known administration times. Patient's daughter was able confirm that patient is currently not on any prescription medications at this time    Changes made to PTA medication list:  Added: None  Deleted: Metoprolol Tartrate + Iron supplement  Changed: None    Allergies reviewed with patient and updates made in EHR: yes    Medication History Completed By: Simon Patiño [facility] 5/6/2025 11:16 AM    PTA Med List   Medication Sig Last Dose/Taking    acetaminophen (TYLENOL) 325 MG tablet Take 325-650 mg by mouth every 6 hours as needed for mild pain Taking As Needed

## 2025-05-06 NOTE — PROGRESS NOTES
Dual Skin Assessment Note:    Patient transferred from ED from to P2.     Comprehensive skin inspection completed by myself and Mimi Post RN.     Abnormal skin assessment findings: Dryness generalized    LDA Initiated for skin breakdown/non-blanchable redness: Not applicable    Provider notified: Not applicable    If yes, WOC Consult order obtained: Not applicable    Sarwat Anand, RN 5:03 PM

## 2025-05-06 NOTE — CONSULTS
"  Thank you, Dr. Warren ref. provider found, for asking the [facility] team to see Ms. Alistair Cuevas to evaluate       Assessment/Recommendations   Assessment/Plan:  Moderate pericardial effusion with tachycardia related to both anemia and effusion, will track VS and repeat limited echo tomorrow.  Afib - hold AC due to anemia and presumed GI bleeding risk - transfuse and once hgb improves consider beta blocker for rate control.  Will discuss LAAO tomorrow but not likely she would agree and not certain she is  candidate given severe anemia.     Last saw Dr. Pulliam in 2022 - will follow up with Afib team      History of Present Illness/Subjective    Ms. Alistair Cuevas is a 75 year old female with recurrent iron def anemia requiring transfusions, came to ED again feeling fatigued with Hgb today down to 4 again, and found mod pericardial effusion, no fever, chills, wt loss, night sweats, no chagne in dyspnea or edema, no chest pian/pressure, no recent viral infection. We spent time discussing source of effusion and methods to remove the fluid if tamponade or to help with diagnosis of etiology including both percutaneous and surgical approaches and she was adamently against  - but not totally against it per the daughter.           Physical Examination Review of Systems   /84   Pulse 116   Temp 98.6  F (37  C) (Oral)   Resp 20   Ht 1.473 m (4' 10\")   Wt 54.4 kg (120 lb)   SpO2 100%   BMI 25.08 kg/m    Body mass index is 25.08 kg/m .  Wt Readings from Last 3 Encounters:   05/06/25 54.4 kg (120 lb)   07/14/22 54.4 kg (120 lb)       Intake/Output Summary (Last 24 hours) at 5/6/2025 1611  Last data filed at 5/6/2025 1323  Gross per 24 hour   Intake 300 ml   Output 250 ml   Net 50 ml     General Appearance:   no distress, normal body habitus   ENT/Mouth: membranes moist, no oral lesions or bleeding gums.      EYES:  no scleral icterus, normal conjunctivae   Neck: no carotid bruits or thyromegaly "   Chest/Lungs:   lungs are clear to auscultation, no rales or wheezing,  sternal scar, equal chest wall expansion    Cardiovascular:   Regular. Normal first and second heart sounds with no murmurs, rubs, or gallops; the carotid, radial and posterior tibial pulses are intact, Jugular venous pressure , edema bilaterally    Abdomen:  no organomegaly, masses, bruits, or tenderness; bowel sounds are present   Extremities: no cyanosis or clubbing   Skin: no xanthelasma, warm.    Neurologic: normal  bilateral, no tremors     Psychiatric: alert and oriented x3, calm     Review of Systems - 12 points nega other than above      Medical History  Surgical History Family History Social History   No past medical history on file. No past surgical history on file. No family history on file. Social History     Socioeconomic History    Marital status: Single     Spouse name: Not on file    Number of children: Not on file    Years of education: Not on file    Highest education level: Not on file   Occupational History    Not on file   Tobacco Use    Smoking status: Not on file    Smokeless tobacco: Not on file   Substance and Sexual Activity    Alcohol use: Not on file    Drug use: Not on file    Sexual activity: Not on file   Other Topics Concern    Not on file   Social History Narrative    Not on file     Social Drivers of Health     Financial Resource Strain: Not At Risk (1/8/2024)    Received from [facility]    Financial Resource Strain     Is it hard for you to pay for the very basics like food, housing, medical care or heating?: No   Food Insecurity: Not At Risk (1/8/2024)    Received from [facility]    Food Insecurity     Does your food run out before you have the money to buy more?: No   Transportation Needs: Not At Risk (1/8/2024)    Received from [facility]    Transportation Needs     Does a lack of transportation keep you from your medical appointments or from getting your medications?: No   Physical  Activity: Not on file   Stress: Not on file   Social Connections: Not on file   Interpersonal Safety: Not on file   Housing Stability: Not on file          Medications  Allergies   Scheduled Meds:  Current Facility-Administered Medications   Medication Dose Route Frequency Provider Last Rate Last Admin    [Held by provider] enoxaparin ANTICOAGULANT (LOVENOX) injection 40 mg  40 mg Subcutaneous Q24H Estephania Villasenor MD        furosemide (LASIX) injection 20 mg  20 mg Intravenous Once Gregg Mendoza MD        metoprolol succinate ER (TOPROL XL) 24 hr tablet 50 mg  50 mg Oral Daily Estephania Villasenor MD   50 mg at 05/06/25 1409    sodium chloride (PF) 0.9% PF flush 3 mL  3 mL Intracatheter Q8H HUBERT Estephania Villasenor MD   3 mL at 05/06/25 1411     Continuous Infusions:  Current Facility-Administered Medications   Medication Dose Route Frequency Provider Last Rate Last Admin     PRN Meds:.  Current Facility-Administered Medications   Medication Dose Route Frequency Provider Last Rate Last Admin    acetaminophen (TYLENOL) tablet 650 mg  650 mg Oral Q4H PRN Estephania Villasenor MD        Or    acetaminophen (TYLENOL) Suppository 650 mg  650 mg Rectal Q4H PRN Estephania Villasenor MD        calcium carbonate (TUMS) chewable tablet 1,000 mg  1,000 mg Oral 4x Daily PRN Estephania Villasenor MD        lidocaine (LMX4) cream   Topical Q1H PRN Estephania Villasenor MD        lidocaine 1 % 0.1-1 mL  0.1-1 mL Other Q1H PRN Estephania Villasenor MD        ondansetron (ZOFRAN ODT) ODT tab 4 mg  4 mg Oral Q6H PRN Estephania Villasenor MD        Or    ondansetron (ZOFRAN) injection 4 mg  4 mg Intravenous Q6H PRN Estephania Villasenor MD        polyethylene glycol (MIRALAX) Packet 17 g  17 g Oral BID PRN Estephania Villasenor MD        prochlorperazine (COMPAZINE) injection 5 mg  5 mg Intravenous Q6H PRN Estephania Villasenor MD        Or    prochlorperazine (COMPAZINE) tablet 5 mg  5 mg Oral Q6H PRN Estephania Villasenor MD        senna-docusate (SENOKOT-S/PERICOLACE)  8.6-50 MG per tablet 1 tablet  1 tablet Oral BID PREstephania Kruse MD        Or    senna-docusate (SENOKOT-S/PERICOLACE) 8.6-50 MG per tablet 2 tablet  2 tablet Oral BID PREstephania Kruse MD        sodium chloride (PF) 0.9% PF flush 3 mL  3 mL Intracatheter q1 min prEstephania Kruse MD        No Known Allergies      Lab Results    Chemistry/lipid CBC Cardiac Enzymes/BNP/TSH/INR   Lab Results   Component Value Date    BUN 8.7 05/06/2025     05/06/2025    CO2 25 05/06/2025    Lab Results   Component Value Date    WBC 5.2 05/06/2025    HGB 4.0 (LL) 05/06/2025    HCT 18.8 (L) 05/06/2025    MCV 64 (L) 05/06/2025     05/06/2025    Lab Results   Component Value Date    TSH 0.97 07/14/2022              Jass Marie MD  Interventional Cardiology  [facility]

## 2025-05-06 NOTE — ED NOTES
"Pt endorses bilateral leg swelling x 1 month approx. And although per daughter \"it has gotten better\" still some and bilateral feeling \"hot\". Temperature normal and no discoloration on writer assessment. Pt does have bilateral pitting edema +1. MD notified  "

## 2025-05-06 NOTE — H&P
[facility]    History and Physical - Hospitalist Service       Date of Admission:  5/6/2025    Assessment & Plan      Alistair Cuevas is a 75 year old female admitted on 5/6/2025. She has a history of Fe deficiency anemia, B12 deficiency, Afib, hyperparathyroidism, osteoporosis, and presumed dementia and is admitted for anemia.     Acute on chronic anemia  Hx of severe recurrent iron defiency anemia requiring intermittent transfusion  Hx of untreated B12 deficiency  Presented to ED with diffuse weakness, fatigue, and intermittent dizziness worse in the past month. Daughter notes has had history of iron deficiency anemia and not on Fe supplement at this time. Denies any hematuria or blood in stool. Had colonoscopy in 2010 with poor prep, none since. Was tachycardic to 130s on arrival though otherwise vitally normal. Workup showed Hgb of 4.0 on admission with MCV of 64. Last hemoglobin in 7/15/22 was 8.9 at hospital discharge after blood transfusion. Seen by GI at that time though declined EGD/colonoscopy.   - s/p 1 unit pRBC; transfuse 2 additional units pRBC  - Hgb check at 1700  - ferritin  - B12   - repeat CBC in AM    Moderate to large pericardial effusion  Peripheral edema  On exam noted 2-3+ edema from feet to at least mid thighs which has been present for months per family report. Concern for possible undiagnosed heart failure as etiology so BNP and echocardiogram ordered. BNP normal. Echocardiogram showed EF of 45-50% and mild hypokinesia of LV. Also noted severe tricuspid regurgitation and moderate to large pericardial effusion. Concern for possible cardiac tamponade based on echo though less suspicion clinically.   - Cardiology consulted, appreciate recs   - monitor vital signs   - 20 mg IV Lasix   - repeat limited echocardiogram on 5/7    Atrial fibrillation  Previously diagnosed during 2022 hospitalization. Noted again in 2024 during clinic visit. Declined EKG at that time.  Tachycardic to 120s and irregular on exam and Afib by EKG and telemetry. Not on anticoagulation or rate controlling medication prior to admission.  - metoprolol 50 mg daily  - hold off on anticoagulation  - Cardiology plans to discuss left atrial appendage occlusion on 5/7  - follow up with Dr. Pulliam or Afib team    Bacteruria  UA noted to be positive for nitrites, 250 leuk esterase, and 33 WBC. Patient denied any symptoms and had only mild suprapubic tenderness on exam. No fever or leukocytosis. Will monitor and defer any antibiotic treatment at this time.   - urine culture in process     Hx of hyperparathyroidism with osteoporosis - not treated     Presumed advanced dementia  Oriented to place and self though disoriented to time per daughter though somewhat difficult to assess with daughter interpreting for patient. Daughter says some days more forgetful than others. Forgets if ate meals, to flush toilet, etc. Requires assistance for all cares.   - consider OT evaluation for MOCA or Mini-Cog in AM    Goals of Care  Patient and daughter unsure about code status. Discussed in-depth though daughter requesting call to son. Presumed Full Code for now. Will call son when able.         Diet: Combination Diet Regular Diet Adult  DVT Prophylaxis: Enoxaparin (Lovenox) SQ  Nieves Catheter: Not present  Fluids: PO  Lines: None     Cardiac Monitoring: ACTIVE order. Indication: Tachyarrhythmias, acute (48 hours)  Code Status: Full Code    Clinically Significant Risk Factors Present on Admission          # Hyperchloremia: Highest Cl = 108 mmol/L in last 2 days, will monitor as appropriate          # Hypoalbuminemia: Lowest albumin = 3.4 g/dL at 5/6/2025  9:26 AM, will monitor as appropriate            # Anemia: based on hgb <11                  Disposition Plan      Expected Discharge Date: 05/08/2025                The patient's care was discussed with the Attending Physician, Dr. Mendoza .    Estephania Villasenor  MD  Hospitalist Service  [facility]  Securely message with [employer] (more info)  Text page via [facility] Paging/Directory   ______________________________________________________________________    Chief Complaint   weakness    History is obtained from the patient, patient's daughter, and chart review    Patient declined  and had daughter interpret.    History of Present Illness   Alistair Cuevas is a 75 year old female who has a history of Fe deficiency anemia, B12 deficiency, Afib, hyperparathyroidism, osteoporosis, and presumed dementia and is admitted for anemia.     Was hospitalized from 7/14-7/15/2022 due to severe anemia with known iron deficiency and B12 deficiency anemia at that time. Transfused RBCs and declined EGD/colonoscopy. Patient left AMA.     Presents with increased weakness, intermittent dizziness especially when ambulating, and fatigue for ~1 month. Daughter also notes she is more pale appearing. No blood in urine or stool. No bleeding or bruising elsewhere. Daughter says stopped taking iron pills due to constipation and change in stool color.     Denies any fevers, chills, weight loss, night sweats, chest pain or shortness of breath.     Born in [facility]. Fearful of medical facilities per daughter especially after death of  in 2020 from COVID. Has had a few phone visits though last seen on 1/8/24 by Dr. Bell at [facility]. Noted to have presumed advanced dementia (no formal eval) requiring care by daughter, Farida Espinoza. Noted to have not wanted evaluation/treatment for any medical issues.      No alcohol, tobacco or betel nut use. No recreational drugs. Takes only Tylenol and supplement from [employer].     Lives with daughter who is patient's PCA. Needs assistance with dressing, hygiene, toileting, meals etc. Has a walker at home. Has a commode in her bedroom.     Past Medical History    No past medical history on file.    Past Surgical History   No past  surgical history on file.    Prior to Admission Medications   Prior to Admission Medications   Prescriptions Last Dose Informant Patient Reported? Taking?   acetaminophen (TYLENOL) 325 MG tablet   Yes Yes   Sig: Take 325-650 mg by mouth every 6 hours as needed for mild pain      Facility-Administered Medications: None           Physical Exam   Vital Signs: Temp: 99.1  F (37.3  C) Temp src: Oral BP: 134/90 Pulse: 111   Resp: 20 SpO2: 100 % O2 Device: None (Room air)    Weight: 112 lbs 6.95 oz    Constitutional: awake, cooperative, no apparent distress; pale-appearing  Eyes: Lids and lashes normal, pupils equal round and reactive to light, sclera clear, conjunctiva normal  ENT: Normocephalic, without obvious abnormality, atraumatic; mucus membranes are moist  Respiratory: Clear to auscultation bilaterally, no crackles or wheezing  Cardiovascular: tachycardic, irregular rhythm; 2-3+ pitting edema from feet to mid thighs  GI: Soft, non-distended, mild tenderness to palpation suprapubically, normal bowel sounds  Neurologic: Awake, alert, oriented to name and place. Disoriented to situation and time (baseline per daughter). Cranial nerves II-XII are grossly intact.        Medical Decision Making       Please see A&P for additional details of medical decision making.      Data   ------------------------- PAST 24 HR DATA REVIEWED -----------------------------------------------    I have personally reviewed the following data over the past 24 hrs:    5.2  \   4.0 (LL)   / 173     138 108 (H) 8.7 /  105 (H)   4.1 25 0.65 \     ALT: 21 AST: 24 AP: 232 (H) TBILI: 1.4 (H)   ALB: 3.4 (L) TOT PROTEIN: 7.1 LIPASE: 53     Trop: N/A BNP: 735     Procal: N/A CRP: N/A Lactic Acid: 1.8         Imaging results reviewed over the past 24 hrs:   Recent Results (from the past 24 hours)   Echocardiogram Complete   Result Value    LVEF  45-50% (mildly reduced)    Narrative    [id number]  [id number]  [id number]  [id number]^AMEYA^[place] [address]     Name: PEREZ WOODSON  MRN: [id number]  : 1950  Study Date: 2025 01:24 PM  Age: 75 yrs  Gender: Female  Patient Location: [facility]  Reason For Study: Edema, Atrial Fibrillation  Ordering Physician: KERRI HOU  Performed By: DELMAR     BSA: 1.5 m2  Height: 58 in  Weight: 120 lb  HR: 123  BP: 134/68 mmHg  ______________________________________________________________________________  Procedure  Echocardiogram with two-dimensional, color and spectral Doppler.  ______________________________________________________________________________  Interpretation Summary     1. The left ventricle is normal in size. Left ventricular function is  decreased. The ejection fraction is 45-50% (mildly reduced). There is mild  global hypokinesia of the left ventricle.  2. Normal right ventricle size and systolic function.  3. There is moderately severe (3+) tricuspid regurgitation.  4. Moderate to large circumferential pericardial effusion.The echo/Doppler  findings are inconclusive for cardiac tamponade. there is respiratory  variation in mitral inflow. No RA or RV collapse. High RA pressure estimated  at 15 mmHg or greater.     Clinical correlation is recommended.  ______________________________________________________________________________  Left Ventricle  The left ventricle is normal in size. Left ventricular function is decreased.  The ejection fraction is 45-50% (mildly reduced). There is normal left  ventricular wall thickness. There is mild global hypokinesia of the left  ventricle.     Right Ventricle  Normal right ventricle size and systolic function.     Atria  The left atrium is severely dilated. The right atrium is severely dilated.     Mitral Valve  The mitral valve leaflets are mildly thickened. There is trace to mild mitral  regurgitation. There is no mitral valve stenosis.     Tricuspid Valve  Tricuspid valve leaflets appear normal. There is  moderately severe (3+)  tricuspid regurgitation. Right ventricular systolic pressure could not be  approximated due to inadequate tricuspid regurgitation. There is no tricuspid  stenosis.     Aortic Valve  The aortic valve is trileaflet. Aortic valve leaflets appear normal. There is  no evidence of aortic stenosis or clinically significant aortic regurgitation.     Pulmonic Valve  The pulmonic valve is not well visualized.     Vessels  The aorta root is normal. Normal size ascending aorta. IVC diameter >2.1 cm  collapsing <50% with sniff suggests a high RA pressure estimated at 15 mmHg or  greater.     Pericardium  Moderate pericardial effusion. A circumferential pericardial effusion is  noted. The echo/Doppler findings are inconclusive for cardiac tamponade.     ______________________________________________________________________________  MMode/2D Measurements & Calculations  IVSd: 0.77 cm  LVIDd: 4.0 cm  LVIDs: 3.0 cm  LVPWd: 0.69 cm  FS: 24.4 %     LV mass(C)d: 83.3 grams  LV mass(C)dI: 56.9 grams/m2  Ao root diam: 3.0 cm  LA dimension: 5.0 cm  asc Aorta Diam: 3.2 cm  LA/Ao: 1.7  LVOT diam: 2.0 cm  LVOT area: 3.1 cm2  Ao root diam index Ht(cm/m): 2.0  Ao root diam index BSA (cm/m2): 2.0  Asc Ao diam index BSA (cm/m2): 2.2  Asc Ao diam index Ht(cm/m): 2.2  EF Biplane: 48.6 %  LA Volume (BP): 92.0 ml     LA Volume Index (BP): 62.6 ml/m2  LA Volume Indexed (AL/bp): 69.7 ml/m2  RV Base: 3.0 cm  RWT: 0.35  TAPSE: 1.1 cm     Doppler Measurements & Calculations  Ao V2 max: 182.0 cm/sec  Ao max P.0 mmHg  Ao V2 mean: 111.0 cm/sec  Ao mean P.0 mmHg  Ao V2 VTI: 27.3 cm  RYANNE(I,D): 2.0 cm2  RYANNE(V,D): 1.8 cm2  LV V1 max P.2 mmHg  LV V1 max: 102.0 cm/sec  LV V1 VTI: 17.8 cm     SV(LVOT): 55.9 ml  SI(LVOT): 38.1 ml/m2  PA V2 max: 96.5 cm/sec  PA max PG: 3.7 mmHg  PA acc time: 0.07 sec  PI end-d lakhwinder: 101.0 cm/sec  AV Lakhwinder Ratio (DI): 0.56  RYANNE Index (cm2/m2): 1.4  RV S Lakhwinder: 13.6 cm/sec      ______________________________________________________________________________  Report approved by: Juanjo Pulliam MD on 05/06/2025 02:35 PM

## 2025-05-06 NOTE — ED PROVIDER NOTES
EMERGENCY DEPARTMENT ENCOUNTER            IMPRESSION:  Atrial fibrillation with RVR  Iron deficiency anemia        MEDICAL DECISION MAKING:  It was my pleasure to provide care for Alistair Cuevas who presented for evaluation of generalized weakness.  She has a history of iron deficiency anemia and atrial fibrillation    On my exam patient is pleasant and cooperative.   Vital signs rapid irregular heart rate and pain.  Physical exam notable for she appears pale.     EKG independently interpreted by myself and demonstrates atrial fibrillation with RVR    Laboratory investigation independently interpreted by myself and notable for hemoglobin of 4    Patient was consented and 2 unit RBC transfusion ordered    Patient admitted to the residency service      =================================================================  CHIEF COMPLAINT:  Chief Complaint   Patient presents with    Generalized Weakness         HPI  Alistair Cuevas is a 75 year old female with a history of hypertension, dementia, osteoporosis, vitamin B12 deficiency, atrial fibrillation with RVR, and iron deficiency anemia who presents to the ED by walk-in for evaluation of generalized weakness.     The following HPI was gathered with the help of the patient's daughter acting as Piedad :     Patient has been generally weak, intermittently dizzy, and more tired than usual for ~1 month now. Her daughter notes that she has appeared quite pale as well. Patient lives at home with her daughter.     Patient denies any other symptoms at this time.       REVIEW OF SYSTEMS  Constitutional: Does not report chills, unintentional weight loss or fatigue   Eyes: Does not report visual changes or discharge    HENT: Does not report sore throat, ear pain or neck pain  Respiratory: Does not report cough or shortness of breath    Cardiovascular: Does not report chest pain, palpitations or leg swelling  GI: Does not report abdominal pain, nausea, vomiting, or dark, bloody  "stools.    : Does not report hematuria, dysuria, or flank pain  Musculoskeletal: Does not report any new musculoskeletal pain or new muscle/joint pains  Skin: Does not report rash or wound  Neurologic: Does not report current headache, new weakness, focal weakness, or sensory changes        Remainder of systems reviewed, unless noted in HPI all others negative.      PAST MEDICAL HISTORY:  No past medical history on file.    PAST SURGICAL HISTORY:  No past surgical history on file.      CURRENT MEDICATIONS:    acetaminophen (TYLENOL) 325 MG tablet        ALLERGIES:  No Known Allergies    FAMILY HISTORY:  No family history on file.    SOCIAL HISTORY:   Social History     Socioeconomic History    Marital status: Single     Social Drivers of Health     Financial Resource Strain: Not At Risk (1/8/2024)    Received from [facility]    Financial Resource Strain     Is it hard for you to pay for the very basics like food, housing, medical care or heating?: No   Food Insecurity: Not At Risk (1/8/2024)    Received from [facility]    Food Insecurity     Does your food run out before you have the money to buy more?: No   Transportation Needs: Not At Risk (1/8/2024)    Received from [facility]    Transportation Needs     Does a lack of transportation keep you from your medical appointments or from getting your medications?: No       PHYSICAL EXAM:    /84   Pulse 116   Temp 98.6  F (37  C) (Oral)   Resp 20   Ht 1.473 m (4' 10\")   Wt 54.4 kg (120 lb)   SpO2 100%   BMI 25.08 kg/m        Constitutional: Awake, alert, she appears pale  Head: Normocephalic, atraumatic.  ENT: Mucous membranes are moist.  No pallor.   Eyes: Pupils are reactive.  No discoloration.  No nystagmus  Neck: No lymphadenopathy, no stridor, supple, no soft tissue swelling  Chest: No tenderness   Respiratory: Rapid irregular heartbeat  Cardiovascular: Regular rate and rhythm.  Good overall perfusion.  Upper and lower extremity pulses are " equal.  GI: Abdomen soft, non-tender to palpation.  No guarding or rebound. Bowel sounds present throughout.   Back: No CVA tenderness.    Musculoskeletal: Moves all 4 extremities equally, full function and capacity no peripheral edema.  Full function  Integument: Warm, dry. No rash. No bruising or petechiae.  Neurologic: Alert & oriented x 3. Normal speech. Grossly normal motor and sensory function. No focal deficits noted.  Cranial nerves intact.   Psychiatric: Normal mood and affect.  Appropriate judgement.    ED COURSE:  9:12 AM I met with the patient for the initial interview and physical examination. Discussed plan for treatment and workup in the ED.    11:20 AM I spoke with the [facility] resident who accepts the patient for admission.     Medical Decision Making      LAB:  Laboratory results were independently reviewed and interpreted  Results for orders placed or performed during the hospital encounter of 05/06/25   Comprehensive metabolic panel   Result Value Ref Range    Sodium 138 135 - 145 mmol/L    Potassium 4.1 3.4 - 5.3 mmol/L    Carbon Dioxide (CO2) 25 22 - 29 mmol/L    Anion Gap 5 (L) 7 - 15 mmol/L    Urea Nitrogen 8.7 8.0 - 23.0 mg/dL    Creatinine 0.65 0.51 - 0.95 mg/dL    GFR Estimate >90 >60 mL/min/1.73m2    Calcium 10.1 8.8 - 10.4 mg/dL    Chloride 108 (H) 98 - 107 mmol/L    Glucose 105 (H) 70 - 99 mg/dL    Alkaline Phosphatase 232 (H) 40 - 150 U/L    AST 24 0 - 45 U/L    ALT 21 0 - 50 U/L    Protein Total 7.1 6.4 - 8.3 g/dL    Albumin 3.4 (L) 3.5 - 5.2 g/dL    Bilirubin Total 1.4 (H) <=1.2 mg/dL   Result Value Ref Range    Lipase 53 13 - 60 U/L   Lactic acid whole blood with 1x repeat in 2 hr when >2   Result Value Ref Range    Lactic Acid, Initial 1.8 0.7 - 2.0 mmol/L   UA with Microscopic reflex to Culture    Specimen: Urine, Clean Catch   Result Value Ref Range    Color Urine Light Yellow Colorless, Straw, Light Yellow, Yellow    Appearance Urine Turbid (A) Clear    Glucose Urine Negative  Negative mg/dL    Bilirubin Urine Negative Negative    Ketones Urine Negative Negative mg/dL    Specific Gravity Urine 1.012 1.001 - 1.030    Blood Urine Negative Negative    pH Urine 7.0 5.0 - 7.0    Protein Albumin Urine Negative Negative mg/dL    Urobilinogen Urine Normal Normal mg/dL    Nitrite Urine Positive (A) Negative    Leukocyte Esterase Urine 250 Pratik/uL (A) Negative    Bacteria Urine Many (A) None Seen /HPF    Mucus Urine Present (A) None Seen /LPF    Amorphous Crystals Urine Few (A) None Seen /HPF    RBC Urine 0 <=2 /HPF    WBC Urine 33 (H) <=5 /HPF    Squamous Epithelials Urine 1 <=1 /HPF   CBC with platelets and differential   Result Value Ref Range    WBC Count 5.2 4.0 - 11.0 10e3/uL    RBC Count 2.94 (L) 3.80 - 5.20 10e6/uL    Hemoglobin 4.0 (LL) 11.7 - 15.7 g/dL    Hematocrit 18.8 (L) 35.0 - 47.0 %    MCV 64 (L) 78 - 100 fL    MCH 13.6 (L) 26.5 - 33.0 pg    MCHC 21.3 (L) 31.5 - 36.5 g/dL    RDW 21.7 (H) 10.0 - 15.0 %    Platelet Count 173 150 - 450 10e3/uL    % Neutrophils 66 %    % Lymphocytes 24 %    % Monocytes 8 %    % Eosinophils 2 %    % Basophils 0 %    % Immature Granulocytes 0 %    NRBCs per 100 WBC 1 (H) <1 /100    Absolute Neutrophils 3.4 1.6 - 8.3 10e3/uL    Absolute Lymphocytes 1.2 0.8 - 5.3 10e3/uL    Absolute Monocytes 0.4 0.0 - 1.3 10e3/uL    Absolute Eosinophils 0.1 0.0 - 0.7 10e3/uL    Absolute Basophils 0.0 0.0 - 0.2 10e3/uL    Absolute Immature Granulocytes 0.0 <=0.4 10e3/uL    Absolute NRBCs 0.0 10e3/uL   Extra Blue Top Tube   Result Value Ref Range    Hold Specimen JIC    Extra Red Top Tube   Result Value Ref Range    Hold Specimen JIC    RBC and Platelet Morphology   Result Value Ref Range    RBC Morphology Confirmed RBC Indices     Platelet Assessment  Automated Count Confirmed. Platelet morphology is normal.     Automated Count Confirmed. Platelet morphology is normal.    Acanthocytes Slight (A) None Seen    Elliptocytes Slight (A) None Seen    Polychromasia Slight (A) None  Seen    Target Cells Slight (A) None Seen    Teardrop Cells Slight (A) None Seen   Nt probnp inpatient   Result Value Ref Range    N terminal Pro BNP Inpatient 735 0 - 1,800 pg/mL   Echocardiogram Complete   Result Value Ref Range    LVEF  45-50% (mildly reduced)    Adult Type and Screen   Result Value Ref Range    ABO/RH(D) B POS     Antibody Screen Negative Negative    SPECIMEN EXPIRATION DATE [id number]    Prepare red blood cells (unit)   Result Value Ref Range    Blood Component Type Red Blood Cells     Product Code U3445C44     Unit Status Transfused     Unit Number [id number]     CROSSMATCH Compatible     CODING SYSTEM [id number]     ISSUE DATE AND TIME [id number]     UNIT ABO/RH B+     UNIT TYPE ISBT 7300    Prepare red blood cells (unit)   Result Value Ref Range    Blood Component Type Red Blood Cells     Product Code O4614M32     Unit Status Transfused     Unit Number [id number]     CROSSMATCH Compatible     CODING SYSTEM [id number]     ISSUE DATE AND TIME [id number]     UNIT ABO/RH B+     UNIT TYPE ISBT 7300          EKG:    ECG results from 07/14/22   ECG 12-LEAD WITH MUSE ([facility])     Value    Systolic Blood Pressure     Diastolic Blood Pressure     Ventricular Rate 130    Atrial Rate 125    WA Interval     QRS Duration 90        QTc 459    P Axis     R AXIS 25    T Axis -33    Interpretation ECG      Atrial fibrillation with rapid ventricular response  Low voltage QRS  Nonspecific ST and T wave abnormality , probably digitalis effect  Abnormal ECG  No previous ECGs available  Confirmed by SEE ED PROVIDER NOTE FOR, ECG INTERPRETATION ([id number]),  SAVI SAINZ ([id number]) on 7/15/2022 6:02:36 AM         I have independently reviewed and interpreted the EKG(s) documented above.      CRITICAL CARE:  Upon my evaluation this patient had a high probability of imminent or life-threatening deterioration due to severe anemia requiring transfusion, which required my direct attention intervention  and personal management.    I have personally provided 60 minutes of critical care time exclusive of time spent on separately billed procedures.  Time includes review of the laboratory data radiology results and discussion with consultants and monitoring for potential decompensation.      MEDICATIONS GIVEN IN THE EMERGENCY:  Medications   lidocaine 1 % 0.1-1 mL (has no administration in time range)   lidocaine (LMX4) cream (has no administration in time range)   sodium chloride (PF) 0.9% PF flush 3 mL (3 mLs Intracatheter $Given 5/6/25 1411)   sodium chloride (PF) 0.9% PF flush 3 mL (has no administration in time range)   senna-docusate (SENOKOT-S/PERICOLACE) 8.6-50 MG per tablet 1 tablet (has no administration in time range)     Or   senna-docusate (SENOKOT-S/PERICOLACE) 8.6-50 MG per tablet 2 tablet (has no administration in time range)   calcium carbonate (TUMS) chewable tablet 1,000 mg (has no administration in time range)   enoxaparin ANTICOAGULANT (LOVENOX) injection 40 mg ( Subcutaneous Automatically Held 5/9/25 1300)   acetaminophen (TYLENOL) tablet 650 mg (has no administration in time range)     Or   acetaminophen (TYLENOL) Suppository 650 mg (has no administration in time range)   polyethylene glycol (MIRALAX) Packet 17 g (has no administration in time range)   ondansetron (ZOFRAN ODT) ODT tab 4 mg (has no administration in time range)     Or   ondansetron (ZOFRAN) injection 4 mg (has no administration in time range)   prochlorperazine (COMPAZINE) injection 5 mg (has no administration in time range)     Or   prochlorperazine (COMPAZINE) tablet 5 mg (has no administration in time range)   metoprolol succinate ER (TOPROL XL) 24 hr tablet 50 mg (50 mg Oral $Given 5/6/25 1409)   furosemide (LASIX) injection 20 mg (has no administration in time range)           NEW PRESCRIPTIONS STARTED AT TODAY'S ER VISIT:  New Prescriptions    No medications on file                FINAL DIAGNOSIS:    ICD-10-CM    1. Iron  deficiency anemia, unspecified iron deficiency anemia type  D50.9                  NAME: Alistair Cuevas  AGE: 75 year old female  YOB: 1950  MRN: [id number]  EVALUATION DATE & TIME: 5/6/2025  9:03 AM    PCP: Briana Ref-Primary, Physician    ED PROVIDER: Pankaj Mancia M.D.      Sylvester HINDS, am serving as a scribe to document services personally performed by Dr. Pankaj Mancia based on my observation and the provider's statements to me. I, Pankaj Mancia MD attest that Sylvester Herbert is acting in a scribe capacity, has observed my performance of the services and has documented them in accordance with my direction.    Pankaj Mancia M.D.  Emergency Medicine  [facility] EMERGENCY DEPARTMENT  [address] [place] [id number]  [phone]  Dept: [phone]  5/6/2025        Pankaj Mancia MD  05/06/25 1554

## 2025-05-06 NOTE — ED TRIAGE NOTES
W/c to triage.  Naa family here.  Pt has been weak and pale for about 1 month. Denies rectal bleeding or vomiting. Pulse oximeter not able to read in triage.      Triage Assessment (Adult)       Row Name 05/06/25 0858          Triage Assessment    Airway WDL WDL        Respiratory WDL    Respiratory WDL WDL        Skin Circulation/Temperature WDL    Skin Circulation/Temperature WDL X;circulation     Skin Circulation pallor        Cardiac WDL    Cardiac WDL X;all     Pulse Rate & Regularity tachycardic        Peripheral/Neurovascular WDL    Peripheral Neurovascular WDL WDL        Cognitive/Neuro/Behavioral WDL    Cognitive/Neuro/Behavioral WDL WDL

## 2025-05-06 NOTE — PROGRESS NOTES
Pt transferring to room 222 via stretcher at this time w/ belongings. Daughter present. Rbc's continue infusing. Pt denies pain.

## 2025-05-07 VITALS
HEART RATE: 79 BPM | SYSTOLIC BLOOD PRESSURE: 125 MMHG | OXYGEN SATURATION: 99 % | TEMPERATURE: 98.4 F | BODY MASS INDEX: 23.03 KG/M2 | HEIGHT: 58 IN | DIASTOLIC BLOOD PRESSURE: 63 MMHG | WEIGHT: 109.7 LBS | RESPIRATION RATE: 22 BRPM

## 2025-05-07 LAB
ANION GAP SERPL CALCULATED.3IONS-SCNC: 4 MMOL/L (ref 7–15)
BACTERIA UR CULT: NORMAL
BLD PROD TYP BPU: NORMAL
BLOOD COMPONENT TYPE: NORMAL
BUN SERPL-MCNC: 8.9 MG/DL (ref 8–23)
CALCIUM SERPL-MCNC: 10 MG/DL (ref 8.8–10.4)
CHLORIDE SERPL-SCNC: 107 MMOL/L (ref 98–107)
CODING SYSTEM: NORMAL
CREAT SERPL-MCNC: 0.69 MG/DL (ref 0.51–0.95)
CROSSMATCH: NORMAL
EGFRCR SERPLBLD CKD-EPI 2021: 89 ML/MIN/1.73M2
ERYTHROCYTE [DISTWIDTH] IN BLOOD BY AUTOMATED COUNT: 26 % (ref 10–15)
GLUCOSE SERPL-MCNC: 85 MG/DL (ref 70–99)
HCO3 SERPL-SCNC: 27 MMOL/L (ref 22–29)
HCT VFR BLD AUTO: 26.2 % (ref 35–47)
HGB BLD-MCNC: 6.8 G/DL (ref 11.7–15.7)
HGB BLD-MCNC: 7.1 G/DL (ref 11.7–15.7)
HGB BLD-MCNC: 9.6 G/DL (ref 11.7–15.7)
ISSUE DATE AND TIME: NORMAL
MCH RBC QN AUTO: 18.3 PG (ref 26.5–33)
MCHC RBC AUTO-ENTMCNC: 26 G/DL (ref 31.5–36.5)
MCV RBC AUTO: 70 FL (ref 78–100)
PLATELET # BLD AUTO: 157 10E3/UL (ref 150–450)
POTASSIUM SERPL-SCNC: 3.7 MMOL/L (ref 3.4–5.3)
RBC # BLD AUTO: 3.72 10E6/UL (ref 3.8–5.2)
SODIUM SERPL-SCNC: 138 MMOL/L (ref 135–145)
UNIT ABO/RH: NORMAL
UNIT NUMBER: NORMAL
UNIT STATUS: NORMAL
UNIT TYPE ISBT: 7300
WBC # BLD AUTO: 4.6 10E3/UL (ref 4–11)

## 2025-05-07 PROCEDURE — 99238 HOSP IP/OBS DSCHRG MGMT 30/<: CPT | Mod: GC

## 2025-05-07 PROCEDURE — 85018 HEMOGLOBIN: CPT

## 2025-05-07 PROCEDURE — 36415 COLL VENOUS BLD VENIPUNCTURE: CPT

## 2025-05-07 PROCEDURE — 250N000013 HC RX MED GY IP 250 OP 250 PS 637

## 2025-05-07 PROCEDURE — 85041 AUTOMATED RBC COUNT: CPT

## 2025-05-07 PROCEDURE — P9016 RBC LEUKOCYTES REDUCED: HCPCS

## 2025-05-07 PROCEDURE — 80048 BASIC METABOLIC PNL TOTAL CA: CPT

## 2025-05-07 RX ORDER — FERROUS SULFATE 325(65) MG
325 TABLET ORAL
Qty: 30 TABLET | Refills: 0 | Status: SHIPPED | OUTPATIENT
Start: 2025-05-07

## 2025-05-07 RX ORDER — FERROUS SULFATE 325(65) MG
325 TABLET ORAL DAILY
Status: DISCONTINUED | OUTPATIENT
Start: 2025-05-07 | End: 2025-05-07 | Stop reason: HOSPADM

## 2025-05-07 RX ORDER — METOPROLOL SUCCINATE 50 MG/1
50 TABLET, EXTENDED RELEASE ORAL DAILY
Qty: 30 TABLET | Refills: 0 | Status: SHIPPED | OUTPATIENT
Start: 2025-05-08

## 2025-05-07 RX ADMIN — ACETAMINOPHEN 650 MG: 325 TABLET ORAL at 08:22

## 2025-05-07 RX ADMIN — METOPROLOL SUCCINATE 50 MG: 50 TABLET, EXTENDED RELEASE ORAL at 08:22

## 2025-05-07 RX ADMIN — FERROUS SULFATE TAB 325 MG (65 MG ELEMENTAL FE) 325 MG: 325 (65 FE) TAB at 12:44

## 2025-05-07 ASSESSMENT — ACTIVITIES OF DAILY LIVING (ADL)
ADLS_ACUITY_SCORE: 48
ADLS_ACUITY_SCORE: 32
ADLS_ACUITY_SCORE: 48
ADLS_ACUITY_SCORE: 32
ADLS_ACUITY_SCORE: 48
ADLS_ACUITY_SCORE: 32
ADLS_ACUITY_SCORE: 48
ADLS_ACUITY_SCORE: 32
ADLS_ACUITY_SCORE: 32
ADLS_ACUITY_SCORE: 48
ADLS_ACUITY_SCORE: 48
ADLS_ACUITY_SCORE: 32
ADLS_ACUITY_SCORE: 48

## 2025-05-07 NOTE — PROGRESS NOTES
Brief progress note    Hgb of 7.6 after 3 units pRBC. Continues to be tachycardic to 110s though improved from prior and history of Afib. Will continue to monitor and plan for Hgb check at 0000.     Estephania Villasenor MD

## 2025-05-07 NOTE — PLAN OF CARE
Problem: Adult Inpatient Plan of Care  Goal: Plan of Care Review  Description: The Plan of Care Review/Shift note should be completed every shift.  The Outcome Evaluation is a brief statement about your assessment that the patient is improving, declining, or no change.  This information will be displayed automatically on your shiftnote.  Outcome: Progressing     Problem: Adult Inpatient Plan of Care  Goal: Absence of Hospital-Acquired Illness or Injury  Outcome: Progressing  Intervention: Prevent Skin Injury  Recent Flowsheet Documentation  Taken 5/7/2025 0000 by Anthony Fair RN  Body Position: position changed independently   Goal Outcome Evaluation:       Denies pain,nausea. PIV, SL. Had BM. Hgb at mid-night 7.1, 0600 hgb 6.8. MD notified.

## 2025-05-07 NOTE — SIGNIFICANT EVENT
Significant Event Note    Time of event: 6:35 AM May 7, 2025    Description of event:  Notified by RN that Hgb 6.8 this AM. Another 1u pRBC ordered.    Juanjo Jasso MD

## 2025-05-07 NOTE — PROGRESS NOTES
[facility]    Progress Note - Hospitalist Service       Date of Admission:  5/6/2025    Assessment & Plan   Alistair Cuevas is a 75 year old female admitted on 5/6/2025. She has a history of Fe deficiency anemia, B12 deficiency, Afib, hyperparathyroidism, osteoporosis, and presumed dementia and is admitted for anemia. Previously declined anemia workup.     Changes 5/7:   - subjective improvement in symptoms along with resolution of tachycardia  - edema of lower extremities almost entirely resolved  - needs iron supplement  - will have afternoon care conference with daughter regarding endoscopy      Acute on chronic anemia  Hx of severe recurrent iron defiency anemia requiring intermittent transfusion  Hx of untreated B12 deficiency  Presented to ED with diffuse weakness, fatigue, and intermittent dizziness worse in the past month. Daughter notes has had history of iron deficiency anemia and not on Fe supplement at this time. Denies any hematuria or blood in stool. Had colonoscopy in 2010 with poor prep, none since. Hospitalized a few years ago for similar presentation - declined scopes at that time and left AMA. Was tachycardic to 130s on arrival though otherwise vitally normal. Workup showed Hgb of 4.0 on admission with MCV of 64. Last hemoglobin in 7/15/22 was 8.9 at hospital discharge after blood transfusion. Seen by GI at that time though declined EGD/colonoscopy. Concern for GI etiology for blood loss and recommending GI consult with endoscopy +/- colonoscopy - need to discuss with daughter  - s/p 1 unit pRBC; transfuse 2 additional units pRBC   - another now s/p 5 units total   - ferritin - 4 - requires iron supplement on discharge  - B12 normal  - Hgb check today after transfusion   - CBC daily   - recommend GI consult for endoscopy and potential colonoscopy - daughter coming in to explain to mother and we will decide this afternoon      Moderate to large pericardial  effusion  Peripheral edema  On exam noted 2-3+ edema from feet to at least mid thighs which has been present for months per family report - now resolved. Concern for possible undiagnosed heart failure as etiology so BNP and echocardiogram ordered. BNP normal. Echocardiogram showed EF of 45-50% and mild hypokinesia of LV. Also noted severe tricuspid regurgitation and moderate to large pericardial effusion. Concern for possible cardiac tamponade based on echo though less suspicion clinically. Repeat echo ordered.   - Cardiology consulted, appreciate recs              - monitor vital signs              - 20 mg IV Lasix              - repeat limited echocardiogram on 5/7     Atrial fibrillation  Previously diagnosed during 2022 hospitalization. Noted again in 2024 during clinic visit. Declined EKG at that time. Tachycardic to 120s and irregular on exam and Afib by EKG and telemetry. Not on anticoagulation or rate controlling medication prior to admission.  - metoprolol 50 mg daily  - hold off on anticoagulation  - Cardiology plans to discuss left atrial appendage occlusion on 5/7  - follow up with Dr. Pulliam or Afib team     Bacteruria  UA noted to be positive for nitrites, 250 leuk esterase, and 33 WBC. Patient denied any symptoms and had only mild suprapubic tenderness on exam. No fever or leukocytosis. Will monitor and defer any antibiotic treatment at this time.   - urine culture in process      Hx of hyperparathyroidism with osteoporosis - not treated      Presumed advanced dementia  Oriented to place and self though disoriented to time per daughter though somewhat difficult to assess with daughter interpreting for patient. Daughter says some days more forgetful than others. Forgets if ate meals, to flush toilet, etc. Requires assistance for all cares.   - consider OT evaluation for MOCA or Mini-Cog in AM     Goals of Care  Patient and daughter unsure about code status. Discussed in-depth though daughter  requesting call to son. Presumed Full Code for now. Will call son when able.              Diet: Combination Diet Regular Diet Adult    DVT Prophylaxis: Enoxaparin (Lovenox) SQ  Nieves Catheter: Not present  Fluids: PO  Lines: None     Cardiac Monitoring: ACTIVE order. Indication: Tachyarrhythmias, acute (48 hours)  Code Status: Full Code      Clinically Significant Risk Factors Present on Admission          # Hyperchloremia: Highest Cl = 108 mmol/L in last 2 days, will monitor as appropriate          # Hypoalbuminemia: Lowest albumin = 3.4 g/dL at 5/6/2025  9:26 AM, will monitor as appropriate          # Anemia: based on hgb <11                  Social Drivers of Health   Depression: At risk (1/8/2024)    Received from "[employer]"    PHQ-2     PHQ-2 Score: 4         Disposition Plan     Medically Ready for Discharge: Anticipated in 2-4 Days         The patient's care was discussed with the Attending Physician, Dr. Mendoza.    Raul Bradshaw MD  Hospitalist Service  [facility]  Securely message with [employer] (more info)  Text page via [employer] Paging/Directory   ______________________________________________________________________    Interval History   Overnight patient had improvement in her heart rate and reports symptomatic improvement today.    No chest pain or difficulty breathing. Very difficult to communicate through interpretor. Indecisive regarding potential scope and wants me to call daughter.     Physical Exam   Vital Signs: Temp: 98.1  F (36.7  C) Temp src: Oral BP: 116/62 Pulse: 99   Resp: 20 SpO2: 100 % O2 Device: None (Room air)    Weight: 109 lbs 11.2 oz  GENERAL: alert and no distress  EYES: Eyes grossly normal to inspection.  No discharge or erythema, or obvious scleral/conjunctival abnormalities.  RESP:  Lungs clear throughout. No wheeze or crackles.   CV: Irregularly irregular rhythm but normal rate. No murmur  MSK: Trace pitting edema BLE up to mid calf.   SKIN: Visible  skin clear. No significant rash, abnormal pigmentation or lesions.  NEURO: Cranial nerves grossly intact.  Mentation and speech appropriate for age.  PSYCH: Mentation appears normal, affect normal/bright, judgement and insight intact, normal speech and appearance well-groomed.      Medical Decision Making       Please see A&P for additional details of medical decision making.      Data   ------------------------- PAST 24 HR DATA REVIEWED -----------------------------------------------    I have personally reviewed the following data over the past 24 hrs:    4.6  \   6.8 (LL)   / 157     138 107 8.9 /  85   3.7 27 0.69 \     Trop: N/A BNP: N/A     Ferritin:  N/A % Retic:  N/A LDH:  N/A       Imaging results reviewed over the past 24 hrs:   Recent Results (from the past 24 hours)   Echocardiogram Complete   Result Value    LVEF  45-50% (mildly reduced)    [facility]    [id number]  [id number]  [id number]  [id number]^AMEYA^[place] [address]     Name: PEREZ WOODSON  MRN: [id number]  : 1950  Study Date: 2025 01:24 PM  Age: 75 yrs  Gender: Female  Patient Location: [facility]  Reason For Study: Edema, Atrial Fibrillation  Ordering Physician: KERRI HOU  Performed By: DELMAR     BSA: 1.5 m2  Height: 58 in  Weight: 120 lb  HR: 123  BP: 134/68 mmHg  ______________________________________________________________________________  Procedure  Echocardiogram with two-dimensional, color and spectral Doppler.  ______________________________________________________________________________  Interpretation Summary     1. The left ventricle is normal in size. Left ventricular function is  decreased. The ejection fraction is 45-50% (mildly reduced). There is mild  global hypokinesia of the left ventricle.  2. Normal right ventricle size and systolic function.  3. There is moderately severe (3+) tricuspid regurgitation.  4. Moderate to large circumferential pericardial effusion.The  echo/Doppler  findings are inconclusive for cardiac tamponade. there is respiratory  variation in mitral inflow. No RA or RV collapse. High RA pressure estimated  at 15 mmHg or greater.     Clinical correlation is recommended.  ______________________________________________________________________________  Left Ventricle  The left ventricle is normal in size. Left ventricular function is decreased.  The ejection fraction is 45-50% (mildly reduced). There is normal left  ventricular wall thickness. There is mild global hypokinesia of the left  ventricle.     Right Ventricle  Normal right ventricle size and systolic function.     Atria  The left atrium is severely dilated. The right atrium is severely dilated.     Mitral Valve  The mitral valve leaflets are mildly thickened. There is trace to mild mitral  regurgitation. There is no mitral valve stenosis.     Tricuspid Valve  Tricuspid valve leaflets appear normal. There is moderately severe (3+)  tricuspid regurgitation. Right ventricular systolic pressure could not be  approximated due to inadequate tricuspid regurgitation. There is no tricuspid  stenosis.     Aortic Valve  The aortic valve is trileaflet. Aortic valve leaflets appear normal. There is  no evidence of aortic stenosis or clinically significant aortic regurgitation.     Pulmonic Valve  The pulmonic valve is not well visualized.     Vessels  The aorta root is normal. Normal size ascending aorta. IVC diameter >2.1 cm  collapsing <50% with sniff suggests a high RA pressure estimated at 15 mmHg or  greater.     Pericardium  Moderate pericardial effusion. A circumferential pericardial effusion is  noted. The echo/Doppler findings are inconclusive for cardiac tamponade.     ______________________________________________________________________________  MMode/2D Measurements & Calculations  IVSd: 0.77 cm  LVIDd: 4.0 cm  LVIDs: 3.0 cm  LVPWd: 0.69 cm  FS: 24.4 %     LV mass(C)d: 83.3 grams  LV mass(C)dI: 56.9  grams/m2  Ao root diam: 3.0 cm  LA dimension: 5.0 cm  asc Aorta Diam: 3.2 cm  LA/Ao: 1.7  LVOT diam: 2.0 cm  LVOT area: 3.1 cm2  Ao root diam index Ht(cm/m): 2.0  Ao root diam index BSA (cm/m2): 2.0  Asc Ao diam index BSA (cm/m2): 2.2  Asc Ao diam index Ht(cm/m): 2.2  EF Biplane: 48.6 %  LA Volume (BP): 92.0 ml     LA Volume Index (BP): 62.6 ml/m2  LA Volume Indexed (AL/bp): 69.7 ml/m2  RV Base: 3.0 cm  RWT: 0.35  TAPSE: 1.1 cm     Doppler Measurements & Calculations  Ao V2 max: 182.0 cm/sec  Ao max P.0 mmHg  Ao V2 mean: 111.0 cm/sec  Ao mean P.0 mmHg  Ao V2 VTI: 27.3 cm  RYANNE(I,D): 2.0 cm2  RYANNE(V,D): 1.8 cm2  LV V1 max P.2 mmHg  LV V1 max: 102.0 cm/sec  LV V1 VTI: 17.8 cm     SV(LVOT): 55.9 ml  SI(LVOT): 38.1 ml/m2  PA V2 max: 96.5 cm/sec  PA max PG: 3.7 mmHg  PA acc time: 0.07 sec  PI end-d lakhwinder: 101.0 cm/sec  AV Lakhwinder Ratio (DI): 0.56  RYANNE Index (cm2/m2): 1.4  RV S Lakhwinder: 13.6 cm/sec     ______________________________________________________________________________  Report approved by: Juanjo Pulliam MD on 2025 02:35 PM

## 2025-05-08 NOTE — DISCHARGE SUMMARY
[facility]  Discharge Summary - Medicine & Pediatrics       Date of Admission:  5/6/2025  Date of Discharge:  5/7/2025  4:47 PM  Discharging Provider: Raul Bradshaw MD, Dr. Mendoza  Discharge Service: Hospitalist Service    Discharge Diagnoses   Acute on chronic anemia requiring blood transfusions  Pericardial effusion and peripheral edema   Atrial fibrillation  Leaving AMA    Clinically Significant Risk Factors          Follow-ups Needed After Discharge   Follow-up Appointments       Hospital Follow-up with Existing Primary Care Provider (PCP)          Schedule Primary Care visit within: 30 Days           Repeat hgb and check in on medication compliance with iron supplement and metoprolol    Unresulted Labs Ordered in the Past 30 Days of this Admission       No orders found from 4/6/2025 to 5/7/2025.        These results will be followed up by Raul Bradshaw MD     Discharge Disposition   Discharged to home - leaving AMA  Condition at discharge: Fair but refusing further diagnostic testing     Hospital Course   Alistair Cuevas was admitted on 5/6/2025 for acute on chronic anemia, afib and pericardial effusion.  The following problems were addressed during her hospitalization: Left AMA     Acute on chronic anemia  Hx of severe recurrent iron defiency anemia requiring intermittent transfusion  Hx of untreated B12 deficiency  Presented to ED with diffuse weakness, fatigue, and intermittent dizziness worse in the past month. Daughter notes has had history of iron deficiency anemia and not on Fe supplement at this time. Denies any hematuria or blood in stool. Had colonoscopy in 2010 with poor prep, none since. Hospitalized a few years ago for similar presentation - declined scopes at that time and left AMA. Was tachycardic to 130s on arrival though otherwise vitally normal. Workup showed Hgb of 4.0 on admission with MCV of 64. Last hemoglobin in 7/15/22 was 8.9 at hospital discharge after blood  transfusion. Seen by GI at that time though declined EGD/colonoscopy. Given 5 units of blood during stay. Low ferritin.   - continue iron supplement  - recheck hgb and iron levels in future visits  - recommend seeing for endoscopy and potential colonoscopy    Moderate to large pericardial effusion  Peripheral edema  On exam noted 2-3+ edema from feet to at least mid thighs which has been present for months per family report - now resolved. Concern for possible undiagnosed heart failure as etiology so BNP and echocardiogram ordered. BNP normal. Echocardiogram showed EF of 45-50% and mild hypokinesia of LV. Also noted severe tricuspid regurgitation and moderate to large pericardial effusion. Concern for possible cardiac tamponade based on echo though less suspicion clinically. Repeat echo ordered.   - Cardiology consulted and wanted a repeat ECHO but patient declined      Atrial fibrillation  Previously diagnosed during 2022 hospitalization. Noted again in 2024 during clinic visit. Declined EKG at that time. Tachycardic to 120s and irregular on exam and Afib by EKG and telemetry. Not on anticoagulation or rate controlling medication prior to admission.  - metoprolol 50 mg daily  - hold off on anticoagulation due to potential bleeding  - follow up with Dr. Pulliam or Afib team       Consultations This Hospital Stay   CARDIOLOGY IP CONSULT  OCCUPATIONAL THERAPY ADULT IP CONSULT    Code Status   Prior       The patient was discussed with Dr. Reji Bradshaw MD  [place] [facility] [address] [place] [address]  Phone: [phone]  Fax: [phone]  ______________________________________________________________________    Physical Exam   Vital Signs: Temp: 98.4  F (36.9  C) Temp src: Oral BP: 125/63 Pulse: 79   Resp: 22 SpO2: 99 % O2 Device: None (Room air)    Weight: 109 lbs 11.2 oz  GENERAL: alert and no distress  EYES: Eyes grossly normal to inspection.  No  discharge or erythema, or obvious scleral/conjunctival abnormalities.  RESP:  Lungs clear throughout. No wheeze or crackles.   CV: Irregularly irregular rhythm but normal rate. No murmur  MSK: Trace pitting edema BLE up to mid calf.   SKIN: Visible skin clear. No significant rash, abnormal pigmentation or lesions.  NEURO: Cranial nerves grossly intact.  Mentation and speech appropriate for age.  PSYCH: Mentation appears normal, affect normal/bright, judgement and insight intact, normal speech and appearance well-groomed.      Primary Care Physician   Michelle Bell    Discharge Orders      Reason for your hospital stay    You came to the hospital for fatigue and were found to have very low blood levels and fluid on your heart. Your symptoms improved with blood transfusion. We wanted to find out where you are losing blood from but you are declining that and leaving against medical advice.     Activity    Your activity upon discharge: activity as tolerated     Diet    Follow this diet upon discharge: Current Diet:Orders Placed This Encounter      Combination Diet Regular Diet Adult     Hospital Follow-up with Existing Primary Care Provider (PCP)            Significant Results and Procedures   Most Recent 3 CBC's:  Recent Labs   Lab Test 05/07/25  1242 05/07/25  0527 05/07/25  0029 05/06/25  1831 05/06/25  0926 07/15/22  1428 07/15/22  0559   WBC  --  4.6  --   --  5.2  --  6.6   HGB 9.6* 6.8* 7.1*   < > 4.0*   < > 8.5*   MCV  --  70*  --   --  64*  --  72*   PLT  --  157  --   --  173  --  180    < > = values in this interval not displayed.     Most Recent 3 BMP's:  Recent Labs   Lab Test 05/07/25  0527 05/06/25  0926 07/14/22  2055    138 136   POTASSIUM 3.7 4.1 3.6   CHLORIDE 107 108* 106   CO2 27 25 23   BUN 8.9 8.7 7*   CR 0.69 0.65 0.77   ANIONGAP 4* 5* 7   SUDEEP 10.0 10.1 9.9   GLC 85 105* 145*   ,   Results for orders placed or performed during the hospital encounter of 05/06/25   Echocardiogram Complete      Value    LVEF  45-50% (mildly reduced)    [facility]    [id number]  [id number]  [id number]  [id number]^AMEYA^[place] [address]     Name: PEREZ WOODSON  MRN: [id number]  : 1950  Study Date: 2025 01:24 PM  Age: 75 yrs  Gender: Female  Patient Location: [facility]  Reason For Study: Edema, Atrial Fibrillation  Ordering Physician: KERRI HOU  Performed By: DELMAR     BSA: 1.5 m2  Height: 58 in  Weight: 120 lb  HR: 123  BP: 134/68 mmHg  ______________________________________________________________________________  Procedure  Echocardiogram with two-dimensional, color and spectral Doppler.  ______________________________________________________________________________  Interpretation Summary     1. The left ventricle is normal in size. Left ventricular function is  decreased. The ejection fraction is 45-50% (mildly reduced). There is mild  global hypokinesia of the left ventricle.  2. Normal right ventricle size and systolic function.  3. There is moderately severe (3+) tricuspid regurgitation.  4. Moderate to large circumferential pericardial effusion.The echo/Doppler  findings are inconclusive for cardiac tamponade. there is respiratory  variation in mitral inflow. No RA or RV collapse. High RA pressure estimated  at 15 mmHg or greater.     Clinical correlation is recommended.  ______________________________________________________________________________  Left Ventricle  The left ventricle is normal in size. Left ventricular function is decreased.  The ejection fraction is 45-50% (mildly reduced). There is normal left  ventricular wall thickness. There is mild global hypokinesia of the left  ventricle.     Right Ventricle  Normal right ventricle size and systolic function.     Atria  The left atrium is severely dilated. The right atrium is severely dilated.     Mitral Valve  The mitral valve leaflets are mildly thickened. There is trace to mild  mitral  regurgitation. There is no mitral valve stenosis.     Tricuspid Valve  Tricuspid valve leaflets appear normal. There is moderately severe (3+)  tricuspid regurgitation. Right ventricular systolic pressure could not be  approximated due to inadequate tricuspid regurgitation. There is no tricuspid  stenosis.     Aortic Valve  The aortic valve is trileaflet. Aortic valve leaflets appear normal. There is  no evidence of aortic stenosis or clinically significant aortic regurgitation.     Pulmonic Valve  The pulmonic valve is not well visualized.     Vessels  The aorta root is normal. Normal size ascending aorta. IVC diameter >2.1 cm  collapsing <50% with sniff suggests a high RA pressure estimated at 15 mmHg or  greater.     Pericardium  Moderate pericardial effusion. A circumferential pericardial effusion is  noted. The echo/Doppler findings are inconclusive for cardiac tamponade.     ______________________________________________________________________________  MMode/2D Measurements & Calculations  IVSd: 0.77 cm  LVIDd: 4.0 cm  LVIDs: 3.0 cm  LVPWd: 0.69 cm  FS: 24.4 %     LV mass(C)d: 83.3 grams  LV mass(C)dI: 56.9 grams/m2  Ao root diam: 3.0 cm  LA dimension: 5.0 cm  asc Aorta Diam: 3.2 cm  LA/Ao: 1.7  LVOT diam: 2.0 cm  LVOT area: 3.1 cm2  Ao root diam index Ht(cm/m): 2.0  Ao root diam index BSA (cm/m2): 2.0  Asc Ao diam index BSA (cm/m2): 2.2  Asc Ao diam index Ht(cm/m): 2.2  EF Biplane: 48.6 %  LA Volume (BP): 92.0 ml     LA Volume Index (BP): 62.6 ml/m2  LA Volume Indexed (AL/bp): 69.7 ml/m2  RV Base: 3.0 cm  RWT: 0.35  TAPSE: 1.1 cm     Doppler Measurements & Calculations  Ao V2 max: 182.0 cm/sec  Ao max P.0 mmHg  Ao V2 mean: 111.0 cm/sec  Ao mean P.0 mmHg  Ao V2 VTI: 27.3 cm  RYANNE(I,D): 2.0 cm2  RYANNE(V,D): 1.8 cm2  LV V1 max P.2 mmHg  LV V1 max: 102.0 cm/sec  LV V1 VTI: 17.8 cm     SV(LVOT): 55.9 ml  SI(LVOT): 38.1 ml/m2  PA V2 max: 96.5 cm/sec  PA max PG: 3.7 mmHg  PA acc time: 0.07 sec  PI  end-d lakhwinder: 101.0 cm/sec  AV Lakhwinder Ratio (DI): 0.56  RYANNE Index (cm2/m2): 1.4  RV S Lakhwinder: 13.6 cm/sec     ______________________________________________________________________________  Report approved by: Juanjo Pulliam MD on 05/06/2025 02:35 PM             Discharge Medications   Discharge Medication List as of 5/7/2025  4:22 PM        START taking these medications    Details   ferrous sulfate (FEROSUL) 325 (65 Fe) MG tablet Take 1 tablet (325 mg) by mouth daily (with breakfast)., Disp-30 tablet, R-0, E-Prescribe      metoprolol succinate ER (TOPROL XL) 50 MG 24 hr tablet Take 1 tablet (50 mg) by mouth daily., Disp-30 tablet, R-0, E-Prescribe           CONTINUE these medications which have NOT CHANGED    Details   acetaminophen (TYLENOL) 325 MG tablet Take 325-650 mg by mouth every 6 hours as needed for mild pain, Historical           Allergies   No Known Allergies

## 2025-05-12 LAB
ATRIAL RATE - MUSE: 118 BPM
DIASTOLIC BLOOD PRESSURE - MUSE: NORMAL MMHG
INTERPRETATION ECG - MUSE: NORMAL
P AXIS - MUSE: 68 DEGREES
PR INTERVAL - MUSE: 160 MS
QRS DURATION - MUSE: 72 MS
QT - MUSE: 174 MS
QTC - MUSE: 243 MS
R AXIS - MUSE: 62 DEGREES
SYSTOLIC BLOOD PRESSURE - MUSE: NORMAL MMHG
T AXIS - MUSE: 170 DEGREES
VENTRICULAR RATE- MUSE: 118 BPM